# Patient Record
Sex: MALE | Race: WHITE | NOT HISPANIC OR LATINO | URBAN - METROPOLITAN AREA
[De-identification: names, ages, dates, MRNs, and addresses within clinical notes are randomized per-mention and may not be internally consistent; named-entity substitution may affect disease eponyms.]

---

## 2017-01-31 ENCOUNTER — OUTPATIENT (OUTPATIENT)
Dept: OUTPATIENT SERVICES | Facility: HOSPITAL | Age: 57
LOS: 1 days | End: 2017-01-31
Payer: COMMERCIAL

## 2017-01-31 LAB
BASOPHILS NFR BLD AUTO: 0.6 % — SIGNIFICANT CHANGE UP (ref 0–2)
EOSINOPHIL NFR BLD AUTO: 3.4 % — SIGNIFICANT CHANGE UP (ref 0–6)
ERYTHROCYTE [SEDIMENTATION RATE] IN BLOOD: 3 MM/HR — SIGNIFICANT CHANGE UP
HCT VFR BLD CALC: 44.6 % — SIGNIFICANT CHANGE UP (ref 39–50)
HGB BLD-MCNC: 15.5 G/DL — SIGNIFICANT CHANGE UP (ref 13–17)
LYMPHOCYTES # BLD AUTO: 21.8 % — SIGNIFICANT CHANGE UP (ref 13–44)
MCHC RBC-ENTMCNC: 30.3 PG — SIGNIFICANT CHANGE UP (ref 27–34)
MCHC RBC-ENTMCNC: 34.8 G/DL — SIGNIFICANT CHANGE UP (ref 32–36)
MCV RBC AUTO: 87.1 FL — SIGNIFICANT CHANGE UP (ref 80–100)
MONOCYTES NFR BLD AUTO: 8.7 % — SIGNIFICANT CHANGE UP (ref 2–14)
NEUTROPHILS NFR BLD AUTO: 65.5 % — SIGNIFICANT CHANGE UP (ref 43–77)
PLATELET # BLD AUTO: 322 K/UL — SIGNIFICANT CHANGE UP (ref 150–400)
RBC # BLD: 5.12 M/UL — SIGNIFICANT CHANGE UP (ref 4.2–5.8)
RBC # FLD: 12.6 % — SIGNIFICANT CHANGE UP (ref 10.3–16.9)
WBC # BLD: 6.2 K/UL — SIGNIFICANT CHANGE UP (ref 3.8–10.5)
WBC # FLD AUTO: 6.2 K/UL — SIGNIFICANT CHANGE UP (ref 3.8–10.5)

## 2017-01-31 PROCEDURE — 85652 RBC SED RATE AUTOMATED: CPT

## 2017-01-31 PROCEDURE — 85025 COMPLETE CBC W/AUTO DIFF WBC: CPT

## 2017-01-31 PROCEDURE — 36415 COLL VENOUS BLD VENIPUNCTURE: CPT

## 2017-02-01 DIAGNOSIS — R53.1 WEAKNESS: ICD-10-CM

## 2017-02-01 DIAGNOSIS — D47.2 MONOCLONAL GAMMOPATHY: ICD-10-CM

## 2017-02-01 DIAGNOSIS — K21.0 GASTRO-ESOPHAGEAL REFLUX DISEASE WITH ESOPHAGITIS: ICD-10-CM

## 2017-02-01 LAB
EOSINOPHIL NFR BLD AUTO: 6 % — SIGNIFICANT CHANGE UP (ref 0–6)
LYMPHOCYTES # BLD AUTO: 21 % — SIGNIFICANT CHANGE UP (ref 13–44)
MANUAL DIF COMMENT BLD-IMP: SIGNIFICANT CHANGE UP
MANUAL SMEAR VERIFICATION: YES — SIGNIFICANT CHANGE UP
MONOCYTES NFR BLD AUTO: 3 % — SIGNIFICANT CHANGE UP (ref 2–14)
NEUTROPHILS NFR BLD AUTO: 70 % — SIGNIFICANT CHANGE UP (ref 43–77)
PLAT MORPH BLD: NORMAL — SIGNIFICANT CHANGE UP
RBC BLD AUTO: NORMAL — SIGNIFICANT CHANGE UP

## 2017-09-11 ENCOUNTER — OUTPATIENT (OUTPATIENT)
Dept: OUTPATIENT SERVICES | Facility: HOSPITAL | Age: 57
LOS: 1 days | End: 2017-09-11
Payer: COMMERCIAL

## 2017-09-11 DIAGNOSIS — I10 ESSENTIAL (PRIMARY) HYPERTENSION: ICD-10-CM

## 2017-09-11 DIAGNOSIS — D47.2 MONOCLONAL GAMMOPATHY: ICD-10-CM

## 2017-09-11 DIAGNOSIS — R53.1 WEAKNESS: ICD-10-CM

## 2017-09-11 LAB
BASOPHILS NFR BLD AUTO: 0.5 % — SIGNIFICANT CHANGE UP (ref 0–2)
EOSINOPHIL NFR BLD AUTO: 3.6 % — SIGNIFICANT CHANGE UP (ref 0–6)
ERYTHROCYTE [SEDIMENTATION RATE] IN BLOOD: 2 MM/HR — SIGNIFICANT CHANGE UP
HCT VFR BLD CALC: 42.4 % — SIGNIFICANT CHANGE UP (ref 39–50)
HGB BLD-MCNC: 14.4 G/DL — SIGNIFICANT CHANGE UP (ref 13–17)
LYMPHOCYTES # BLD AUTO: 20.6 % — SIGNIFICANT CHANGE UP (ref 13–44)
MCHC RBC-ENTMCNC: 30.2 PG — SIGNIFICANT CHANGE UP (ref 27–34)
MCHC RBC-ENTMCNC: 34 G/DL — SIGNIFICANT CHANGE UP (ref 32–36)
MCV RBC AUTO: 88.9 FL — SIGNIFICANT CHANGE UP (ref 80–100)
MONOCYTES NFR BLD AUTO: 6.4 % — SIGNIFICANT CHANGE UP (ref 2–14)
NEUTROPHILS NFR BLD AUTO: 68.9 % — SIGNIFICANT CHANGE UP (ref 43–77)
PLATELET # BLD AUTO: 300 K/UL — SIGNIFICANT CHANGE UP (ref 150–400)
RBC # BLD: 4.77 M/UL — SIGNIFICANT CHANGE UP (ref 4.2–5.8)
RBC # FLD: 12.9 % — SIGNIFICANT CHANGE UP (ref 10.3–16.9)
WBC # BLD: 6.1 K/UL — SIGNIFICANT CHANGE UP (ref 3.8–10.5)
WBC # FLD AUTO: 6.1 K/UL — SIGNIFICANT CHANGE UP (ref 3.8–10.5)

## 2017-09-11 PROCEDURE — 85025 COMPLETE CBC W/AUTO DIFF WBC: CPT

## 2017-09-11 PROCEDURE — 36415 COLL VENOUS BLD VENIPUNCTURE: CPT

## 2017-09-11 PROCEDURE — 85652 RBC SED RATE AUTOMATED: CPT

## 2017-09-12 LAB
BASOPHILS NFR BLD AUTO: 1 % — SIGNIFICANT CHANGE UP (ref 0–2)
EOSINOPHIL NFR BLD AUTO: 3 % — SIGNIFICANT CHANGE UP (ref 0–6)
LYMPHOCYTES # BLD AUTO: 19 % — SIGNIFICANT CHANGE UP (ref 13–44)
MANUAL DIF COMMENT BLD-IMP: SIGNIFICANT CHANGE UP
MANUAL DIF COMMENT BLD-IMP: SIGNIFICANT CHANGE UP
MANUAL SMEAR VERIFICATION: YES — SIGNIFICANT CHANGE UP
MONOCYTES NFR BLD AUTO: 7 % — SIGNIFICANT CHANGE UP (ref 2–14)
MYELOCYTES NFR BLD: 1 % — HIGH
NEUTROPHILS NFR BLD AUTO: 69 % — SIGNIFICANT CHANGE UP (ref 43–77)
PLAT MORPH BLD: NORMAL — SIGNIFICANT CHANGE UP
RBC BLD AUTO: NORMAL — SIGNIFICANT CHANGE UP
SMUDGE CELLS # BLD: SIGNIFICANT CHANGE UP

## 2018-06-19 ENCOUNTER — OUTPATIENT (OUTPATIENT)
Dept: OUTPATIENT SERVICES | Facility: HOSPITAL | Age: 58
LOS: 1 days | End: 2018-06-19
Payer: COMMERCIAL

## 2018-06-19 LAB
BASOPHILS NFR BLD AUTO: 0.6 % — SIGNIFICANT CHANGE UP (ref 0–2)
EOSINOPHIL NFR BLD AUTO: 4.2 % — SIGNIFICANT CHANGE UP (ref 0–6)
ERYTHROCYTE [SEDIMENTATION RATE] IN BLOOD: 2 MM/HR — SIGNIFICANT CHANGE UP
HCT VFR BLD CALC: 40.9 % — SIGNIFICANT CHANGE UP (ref 39–50)
HGB BLD-MCNC: 13.8 G/DL — SIGNIFICANT CHANGE UP (ref 13–17)
LYMPHOCYTES # BLD AUTO: 22.5 % — SIGNIFICANT CHANGE UP (ref 13–44)
MCHC RBC-ENTMCNC: 29.9 PG — SIGNIFICANT CHANGE UP (ref 27–34)
MCHC RBC-ENTMCNC: 33.7 G/DL — SIGNIFICANT CHANGE UP (ref 32–36)
MCV RBC AUTO: 88.7 FL — SIGNIFICANT CHANGE UP (ref 80–100)
MONOCYTES NFR BLD AUTO: 6.1 % — SIGNIFICANT CHANGE UP (ref 2–14)
NEUTROPHILS NFR BLD AUTO: 66.6 % — SIGNIFICANT CHANGE UP (ref 43–77)
PLATELET # BLD AUTO: 280 K/UL — SIGNIFICANT CHANGE UP (ref 150–400)
RBC # BLD: 4.61 M/UL — SIGNIFICANT CHANGE UP (ref 4.2–5.8)
RBC # FLD: 12.9 % — SIGNIFICANT CHANGE UP (ref 10.3–16.9)
WBC # BLD: 5.2 K/UL — SIGNIFICANT CHANGE UP (ref 3.8–10.5)
WBC # FLD AUTO: 5.2 K/UL — SIGNIFICANT CHANGE UP (ref 3.8–10.5)

## 2018-06-19 PROCEDURE — 85652 RBC SED RATE AUTOMATED: CPT

## 2018-06-19 PROCEDURE — 85025 COMPLETE CBC W/AUTO DIFF WBC: CPT

## 2018-06-19 PROCEDURE — 99214 OFFICE O/P EST MOD 30 MIN: CPT

## 2018-06-19 PROCEDURE — 36415 COLL VENOUS BLD VENIPUNCTURE: CPT

## 2018-06-20 LAB
EOSINOPHIL NFR BLD AUTO: 4 % — SIGNIFICANT CHANGE UP (ref 0–6)
LYMPHOCYTES # BLD AUTO: 23 % — SIGNIFICANT CHANGE UP (ref 13–44)
MANUAL DIF COMMENT BLD-IMP: SIGNIFICANT CHANGE UP
MANUAL SMEAR VERIFICATION: YES — SIGNIFICANT CHANGE UP
MONOCYTES NFR BLD AUTO: 6 % — SIGNIFICANT CHANGE UP (ref 2–14)
NEUTROPHILS NFR BLD AUTO: 67 % — SIGNIFICANT CHANGE UP (ref 43–77)
PLAT MORPH BLD: NORMAL — SIGNIFICANT CHANGE UP
RBC BLD AUTO: NORMAL — SIGNIFICANT CHANGE UP

## 2018-06-21 DIAGNOSIS — D47.2 MONOCLONAL GAMMOPATHY: ICD-10-CM

## 2018-06-21 DIAGNOSIS — M79.1 MYALGIA: ICD-10-CM

## 2018-06-21 DIAGNOSIS — I10 ESSENTIAL (PRIMARY) HYPERTENSION: ICD-10-CM

## 2018-07-09 PROBLEM — Z00.00 ENCOUNTER FOR PREVENTIVE HEALTH EXAMINATION: Status: ACTIVE | Noted: 2018-07-09

## 2018-09-25 ENCOUNTER — APPOINTMENT (OUTPATIENT)
Dept: HEMATOLOGY ONCOLOGY | Facility: CLINIC | Age: 58
End: 2018-09-25
Payer: COMMERCIAL

## 2018-09-25 VITALS
TEMPERATURE: 98 F | HEART RATE: 71 BPM | HEIGHT: 72 IN | DIASTOLIC BLOOD PRESSURE: 80 MMHG | BODY MASS INDEX: 29.66 KG/M2 | WEIGHT: 219 LBS | OXYGEN SATURATION: 98 % | SYSTOLIC BLOOD PRESSURE: 116 MMHG

## 2018-09-25 DIAGNOSIS — Z82.0 FAMILY HISTORY OF EPILEPSY AND OTHER DISEASES OF THE NERVOUS SYSTEM: ICD-10-CM

## 2018-09-25 DIAGNOSIS — J45.909 UNSPECIFIED ASTHMA, UNCOMPLICATED: ICD-10-CM

## 2018-09-25 DIAGNOSIS — Z86.39 PERSONAL HISTORY OF OTHER ENDOCRINE, NUTRITIONAL AND METABOLIC DISEASE: ICD-10-CM

## 2018-09-25 DIAGNOSIS — S22.41XA MULTIPLE FRACTURES OF RIBS, RIGHT SIDE, INITIAL ENCOUNTER FOR CLOSED FRACTURE: ICD-10-CM

## 2018-09-25 DIAGNOSIS — Z82.5 FAMILY HISTORY OF ASTHMA AND OTHER CHRONIC LOWER RESPIRATORY DISEASES: ICD-10-CM

## 2018-09-25 DIAGNOSIS — K29.70 GASTRITIS, UNSPECIFIED, W/OUT BLEEDING: ICD-10-CM

## 2018-09-25 DIAGNOSIS — Z87.19 PERSONAL HISTORY OF OTHER DISEASES OF THE DIGESTIVE SYSTEM: ICD-10-CM

## 2018-09-25 DIAGNOSIS — Z78.9 OTHER SPECIFIED HEALTH STATUS: ICD-10-CM

## 2018-09-25 DIAGNOSIS — K21.9 GASTRO-ESOPHAGEAL REFLUX DISEASE W/OUT ESOPHAGITIS: ICD-10-CM

## 2018-09-25 DIAGNOSIS — Z80.1 FAMILY HISTORY OF MALIGNANT NEOPLASM OF TRACHEA, BRONCHUS AND LUNG: ICD-10-CM

## 2018-09-25 LAB
BASOPHILS NFR BLD AUTO: 0.5 %
EOSINOPHIL NFR BLD AUTO: 2.7 %
HCT VFR BLD CALC: 42.8 %
HGB BLD-MCNC: 14.5 G/DL
LYMPHOCYTES NFR BLD AUTO: 23.4 %
MAN DIFF?: NO
MCHC RBC-ENTMCNC: 30.8 PG
MCHC RBC-ENTMCNC: 33.9 G/DL
MCV RBC AUTO: 90.9 FL
MONOCYTES NFR BLD AUTO: 7.6 %
NEUTROPHILS NFR BLD AUTO: 65.8 %
PLATELET # BLD AUTO: 300 K/UL
RBC # BLD: 4.71 M/UL
RBC # FLD: 12.8 %
WBC # FLD AUTO: 5.9 K/UL

## 2018-09-25 PROCEDURE — 99214 OFFICE O/P EST MOD 30 MIN: CPT | Mod: 25

## 2018-09-25 PROCEDURE — 36415 COLL VENOUS BLD VENIPUNCTURE: CPT

## 2018-09-25 RX ORDER — CALCIUM CARBONATE 500 MG/1
TABLET, CHEWABLE ORAL
Refills: 0 | Status: ACTIVE | COMMUNITY

## 2018-09-25 RX ORDER — PROPRANOLOL HYDROCHLORIDE 10 MG/1
10 TABLET ORAL
Refills: 0 | Status: ACTIVE | COMMUNITY

## 2018-09-25 RX ORDER — ROSUVASTATIN CALCIUM 20 MG/1
20 TABLET, FILM COATED ORAL
Refills: 0 | Status: ACTIVE | COMMUNITY

## 2018-09-26 ENCOUNTER — LABORATORY RESULT (OUTPATIENT)
Age: 58
End: 2018-09-26

## 2018-09-26 LAB — ERYTHROCYTE [SEDIMENTATION RATE] IN BLOOD BY WESTERGREN METHOD: 2 MM/HR

## 2019-01-31 ENCOUNTER — APPOINTMENT (OUTPATIENT)
Dept: HEMATOLOGY ONCOLOGY | Facility: CLINIC | Age: 59
End: 2019-01-31
Payer: COMMERCIAL

## 2019-01-31 VITALS
WEIGHT: 222 LBS | BODY MASS INDEX: 30.07 KG/M2 | DIASTOLIC BLOOD PRESSURE: 82 MMHG | HEIGHT: 72 IN | TEMPERATURE: 97.6 F | HEART RATE: 76 BPM | SYSTOLIC BLOOD PRESSURE: 136 MMHG | OXYGEN SATURATION: 98 %

## 2019-01-31 DIAGNOSIS — R53.1 WEAKNESS: ICD-10-CM

## 2019-01-31 PROCEDURE — 99214 OFFICE O/P EST MOD 30 MIN: CPT | Mod: 25

## 2019-01-31 PROCEDURE — 36415 COLL VENOUS BLD VENIPUNCTURE: CPT

## 2019-01-31 NOTE — REASON FOR VISIT
[Monoclonal Gammopathy] : monoclonal gammopathy [Follow-Up Visit] : a follow-up visit for [FreeTextEntry2] : fatigue

## 2019-01-31 NOTE — HISTORY OF PRESENT ILLNESS
[de-identified] : Patient is a 59 year old male who is here for follow up for IgG kappa monoclonal gammopathy, which has been stable over several years, and fatigue. At last visit in September 2018, the IgG was 712, IgA 85, IgM 58..A faint IgG kappa band was again detected. All other parameters including CBC were completely normal. The patient feels well except for the usual fatigue which is likely work and stress related and occasional gastritis symptoms for which he takes TUMS.. The tightness in his chest which he attributes to anxiety has occurred less often. He has an upcoming annual exam with Dr. Zhang at which time he will request stress testing.

## 2019-02-01 LAB
BASOPHILS NFR BLD AUTO: 0.4 %
EOSINOPHIL NFR BLD AUTO: 2 %
ERYTHROCYTE [SEDIMENTATION RATE] IN BLOOD BY WESTERGREN METHOD: 1 MM/HR
HCT VFR BLD CALC: 45 %
HGB BLD-MCNC: 15.1 G/DL
LYMPHOCYTES NFR BLD AUTO: 20.9 %
MAN DIFF?: NO
MCHC RBC-ENTMCNC: 30.3 PG
MCHC RBC-ENTMCNC: 33.6 G/DL
MCV RBC AUTO: 90.2 FL
MONOCYTES NFR BLD AUTO: 8.3 %
NEUTROPHILS NFR BLD AUTO: 68.4 %
PLATELET # BLD AUTO: 307 K/UL
RBC # BLD: 4.99 M/UL
RBC # FLD: 12.8 %
WBC # FLD AUTO: 6.9 K/UL

## 2019-05-02 ENCOUNTER — APPOINTMENT (OUTPATIENT)
Dept: HEMATOLOGY ONCOLOGY | Facility: CLINIC | Age: 59
End: 2019-05-02
Payer: COMMERCIAL

## 2019-05-02 VITALS
HEIGHT: 72 IN | HEART RATE: 67 BPM | BODY MASS INDEX: 30.07 KG/M2 | OXYGEN SATURATION: 98 % | DIASTOLIC BLOOD PRESSURE: 84 MMHG | WEIGHT: 222 LBS | SYSTOLIC BLOOD PRESSURE: 130 MMHG | TEMPERATURE: 98.3 F

## 2019-05-02 LAB
BASOPHILS # BLD AUTO: 0.03 K/UL
BASOPHILS NFR BLD AUTO: 0.5 %
EOSINOPHIL # BLD AUTO: 0.18 K/UL
EOSINOPHIL NFR BLD AUTO: 3 %
HCT VFR BLD CALC: 44 %
HGB BLD-MCNC: 14.8 G/DL
IMM GRANULOCYTES NFR BLD AUTO: 0.5 %
LYMPHOCYTES # BLD AUTO: 1.48 K/UL
LYMPHOCYTES NFR BLD AUTO: 24.5 %
MAN DIFF?: NORMAL
MCHC RBC-ENTMCNC: 31.4 PG
MCHC RBC-ENTMCNC: 33.6 GM/DL
MCV RBC AUTO: 93.2 FL
MONOCYTES # BLD AUTO: 0.49 K/UL
MONOCYTES NFR BLD AUTO: 8.1 %
NEUTROPHILS # BLD AUTO: 3.83 K/UL
NEUTROPHILS NFR BLD AUTO: 63.4 %
PLATELET # BLD AUTO: 306 K/UL
RBC # BLD: 4.72 M/UL
RBC # FLD: 12.5 %
WBC # FLD AUTO: 6.04 K/UL

## 2019-05-02 PROCEDURE — 99213 OFFICE O/P EST LOW 20 MIN: CPT | Mod: 25

## 2019-05-02 PROCEDURE — 36415 COLL VENOUS BLD VENIPUNCTURE: CPT

## 2019-05-02 NOTE — HISTORY OF PRESENT ILLNESS
[de-identified] : Patient is a 58 year old male who is here for follow up for IgG kappa monoclonal gammopathy, which has been stable over several years, and fatigue. The patient feels fairly well. He takes TUMS for occasional gastritis which responds well. He was started on a new medication for hypercholesterolemia by his PCP, Dr. Zhang. Denies fever, chills, bone pain, sweats or infections.

## 2019-05-02 NOTE — END OF VISIT
[FreeTextEntry3] : All medical record entries made by the scribe were at my, Dr. Ridley direction and personally dictated by me on May 02, 2019 . I have reviewed the chart and agree that the record accurately reflects my personal performance of the history, physical exam, assessment and plan. I have also personally directed, reviewed, and agreed with the chart.\par

## 2019-05-02 NOTE — ASSESSMENT
[FreeTextEntry1] : Patient is a 58 year old male who is here for follow up for IgG kappa monoclonal gammopathy, which has been stable over several years, and fatigue. A future bone marrow biopsy was discussed with patient. Have ordered CBC, sedimentation rate, beta-2-microglobulin, uric acid, LDH, viscosity, CRP, Immunoelectrophoresis, Immunofixation, Immunoglobulin Free Light Chains, Immunoglobulins Panel, Protein Electrophoresis. Patient was advised to call office to discuss results and next appointment date.

## 2019-05-02 NOTE — ADDENDUM
[FreeTextEntry1] : I, Rochelle Austin, acted solely as a scribe for Dr. Ridley on May 02, 2019 .\par

## 2019-05-03 ENCOUNTER — LABORATORY RESULT (OUTPATIENT)
Age: 59
End: 2019-05-03

## 2019-05-03 LAB — ERYTHROCYTE [SEDIMENTATION RATE] IN BLOOD BY WESTERGREN METHOD: 2 MM/HR

## 2019-09-05 ENCOUNTER — APPOINTMENT (OUTPATIENT)
Dept: HEMATOLOGY ONCOLOGY | Facility: CLINIC | Age: 59
End: 2019-09-05
Payer: COMMERCIAL

## 2019-09-05 VITALS
BODY MASS INDEX: 30.2 KG/M2 | TEMPERATURE: 98.5 F | SYSTOLIC BLOOD PRESSURE: 124 MMHG | HEART RATE: 68 BPM | HEIGHT: 72 IN | OXYGEN SATURATION: 98 % | WEIGHT: 223 LBS | DIASTOLIC BLOOD PRESSURE: 80 MMHG

## 2019-09-05 LAB
BASOPHILS # BLD AUTO: 0.04 K/UL
BASOPHILS NFR BLD AUTO: 0.7 %
EOSINOPHIL # BLD AUTO: 0.29 K/UL
EOSINOPHIL NFR BLD AUTO: 5.3 %
ERYTHROCYTE [SEDIMENTATION RATE] IN BLOOD BY WESTERGREN METHOD: 1 MM/HR
HCT VFR BLD CALC: 44.2 %
HGB BLD-MCNC: 14.4 G/DL
IMM GRANULOCYTES NFR BLD AUTO: 0.2 %
LYMPHOCYTES # BLD AUTO: 1.35 K/UL
LYMPHOCYTES NFR BLD AUTO: 24.5 %
MAN DIFF?: NORMAL
MCHC RBC-ENTMCNC: 30.3 PG
MCHC RBC-ENTMCNC: 32.6 GM/DL
MCV RBC AUTO: 93.1 FL
MONOCYTES # BLD AUTO: 0.48 K/UL
MONOCYTES NFR BLD AUTO: 8.7 %
NEUTROPHILS # BLD AUTO: 3.33 K/UL
NEUTROPHILS NFR BLD AUTO: 60.6 %
PLATELET # BLD AUTO: 306 K/UL
RBC # BLD: 4.75 M/UL
RBC # FLD: 12.3 %
WBC # FLD AUTO: 5.5 K/UL

## 2019-09-05 PROCEDURE — 36415 COLL VENOUS BLD VENIPUNCTURE: CPT

## 2019-09-05 PROCEDURE — 99213 OFFICE O/P EST LOW 20 MIN: CPT | Mod: 25

## 2019-09-05 RX ORDER — EZETIMIBE 10 MG/1
10 TABLET ORAL
Refills: 0 | Status: ACTIVE | COMMUNITY

## 2019-09-05 NOTE — HISTORY OF PRESENT ILLNESS
[de-identified] : Patient is a 58 year old male who presents for follow up for IgG kappa monoclonal gammopathy, which has been stable over several years, and fatigue.  Patient complains of mild fatigue and right elbow soreness. Denies bone pain, unintentional weight loss, excessive bleeding, fever, chills, or sweats.Bone marrow studies were again discussed with patient and he is considering having this done with sedation in the outpatient setting at [facility]. He did not complete a 24 hour urine for immunoelectrophoresis when last requested but has agreed to do so presently.

## 2019-09-05 NOTE — ADDENDUM
[FreeTextEntry1] : I, Shakira Lopez, acted solely as a scribe for Dr. Christina Ridley on 09/05/2019.

## 2019-09-05 NOTE — END OF VISIT
[FreeTextEntry3] : All medical record entries made by the Scribe were at my, Dr. Christina Ridley, direction and personally dictated by me on 09/05/2019. I have reviewed the chart and agree that the record accurately reflects my personal performance of the history, physical exam, assessment and plan. I have also personally directed, reviewed, and agreed with the chart.

## 2019-09-05 NOTE — REASON FOR VISIT
[Follow-Up Visit] : a follow-up visit for [FreeTextEntry2] : IgG Kappa Monoclonal gammopathy, fatigue

## 2019-09-05 NOTE — ASSESSMENT
[FreeTextEntry1] : Patient is a 58 year old male who presents for follow up for IgG kappa monoclonal gammopathy, which has been stable over several years, and fatigue. Have again discussed the possibility of bone marrow studies. Patient will consider having the procedure in the near future. Have ordered CBC, beta-2-microglobulin, CMP, CRP, immunoelectrophoresis with immunofixation, immunoglobulin free light chains, immunoglobulins panel, protein electrophoresis, sedimentation rate, uric acid, and viscosity, 24 hour urine collection for immunoelectrophoresis.. Patient was advised to call office to discuss results and next appointment date. \par

## 2019-09-05 NOTE — REVIEW OF SYSTEMS
[Fatigue] : fatigue [Joint Pain] : joint pain [Joint Stiffness] : joint stiffness [Negative] : Allergic/Immunologic [FreeTextEntry9] : right elbow soreness

## 2020-02-24 ENCOUNTER — APPOINTMENT (OUTPATIENT)
Dept: HEMATOLOGY ONCOLOGY | Facility: CLINIC | Age: 60
End: 2020-02-24
Payer: COMMERCIAL

## 2020-02-24 VITALS
HEART RATE: 69 BPM | TEMPERATURE: 98 F | BODY MASS INDEX: 30.61 KG/M2 | WEIGHT: 226 LBS | DIASTOLIC BLOOD PRESSURE: 88 MMHG | HEIGHT: 72 IN | OXYGEN SATURATION: 98 % | SYSTOLIC BLOOD PRESSURE: 130 MMHG

## 2020-02-24 LAB
BASOPHILS # BLD AUTO: 0.05 K/UL
BASOPHILS NFR BLD AUTO: 0.9 %
EOSINOPHIL # BLD AUTO: 0.2 K/UL
EOSINOPHIL NFR BLD AUTO: 3.5 %
HCT VFR BLD CALC: 42 %
HGB BLD-MCNC: 14.3 G/DL
IMM GRANULOCYTES NFR BLD AUTO: 0.2 %
LYMPHOCYTES # BLD AUTO: 1.37 K/UL
LYMPHOCYTES NFR BLD AUTO: 24.2 %
MAN DIFF?: NORMAL
MCHC RBC-ENTMCNC: 31.2 PG
MCHC RBC-ENTMCNC: 34 GM/DL
MCV RBC AUTO: 91.5 FL
MONOCYTES # BLD AUTO: 0.57 K/UL
MONOCYTES NFR BLD AUTO: 10.1 %
NEUTROPHILS # BLD AUTO: 3.45 K/UL
NEUTROPHILS NFR BLD AUTO: 61.1 %
PLATELET # BLD AUTO: 261 K/UL
RBC # BLD: 4.59 M/UL
RBC # FLD: 12.9 %
WBC # FLD AUTO: 5.65 K/UL

## 2020-02-24 PROCEDURE — 99213 OFFICE O/P EST LOW 20 MIN: CPT | Mod: 25

## 2020-02-24 PROCEDURE — 36415 COLL VENOUS BLD VENIPUNCTURE: CPT

## 2020-02-24 NOTE — ASSESSMENT
[FreeTextEntry1] : Patient is a 59 year old male who presents for hematologic follow-up for IgG kappa monoclonal gammopathy, which has been stable over several years, and fatigue. Have again discussed the possibility of bone marrow studies, and patient will again consider having the procedure at some point. Have ordered CBC, beta-2-microglobulin, CMP, CRP, immunoelectrophoresis with immunofixation, immunoglobulin free light chains, immunoglobulins panel, protein electrophoresis, sedimentation rate, uric acid, and viscosity. Patient was advised to call office to discuss results and next appointment date.

## 2020-02-24 NOTE — END OF VISIT
[FreeTextEntry3] : All medical record entries made by the Scribe were at my, Dr. Christina Ridley, direction and personally dictated by me on 02/24/2020. I have reviewed the chart and agree that the record accurately reflects my personal performance of the history, physical exam, assessment and plan. I have also personally directed, reviewed, and agreed with the chart.

## 2020-02-24 NOTE — REVIEW OF SYSTEMS
[Negative] : Endocrine [FreeTextEntry4] : recently had tube insertion in both ears to relieve sinus congestion

## 2020-02-24 NOTE — REASON FOR VISIT
[Follow-Up Visit] : a follow-up visit for [FreeTextEntry2] : Monoclonal gammopathy of undetermined significance

## 2020-02-24 NOTE — ADDENDUM
[FreeTextEntry1] : I, Archie Dean, acted solely as a scribe for Dr. Christina Ridley on 02/24/2020.

## 2020-02-24 NOTE — HISTORY OF PRESENT ILLNESS
[de-identified] : Patient is a 59 year old male who presents for hematologic follow-up for IgG kappa monoclonal gammopathy, which has been stable over several years. Recent blood results revealed that there was little change in IgG kappa from previous determination and a faint IgG lambda was seen. CBC, CMP, Sed rate and all other parameters followed were normal.. Since the last visit, patient had tube insertion in both ears to relieve sinus congestion.  Bone marrow studies were again discussed with patient and he will consider having this done at some point. Patient is feeling generally well at this time, with no acute complaints. Denies fatigue, joint pain, fever, chills,  sweats bone pain or infections.

## 2020-02-25 ENCOUNTER — LABORATORY RESULT (OUTPATIENT)
Age: 60
End: 2020-02-25

## 2020-02-25 LAB — ERYTHROCYTE [SEDIMENTATION RATE] IN BLOOD BY WESTERGREN METHOD: 3 MM/HR

## 2020-07-23 ENCOUNTER — APPOINTMENT (OUTPATIENT)
Dept: HEMATOLOGY ONCOLOGY | Facility: CLINIC | Age: 60
End: 2020-07-23
Payer: COMMERCIAL

## 2020-07-23 VITALS
SYSTOLIC BLOOD PRESSURE: 138 MMHG | OXYGEN SATURATION: 99 % | BODY MASS INDEX: 30.2 KG/M2 | DIASTOLIC BLOOD PRESSURE: 90 MMHG | HEIGHT: 72 IN | HEART RATE: 60 BPM | TEMPERATURE: 97.3 F | WEIGHT: 223 LBS

## 2020-07-23 DIAGNOSIS — R20.0 ANESTHESIA OF SKIN: ICD-10-CM

## 2020-07-23 LAB
BASOPHILS # BLD AUTO: 0.06 K/UL
BASOPHILS NFR BLD AUTO: 1 %
EOSINOPHIL # BLD AUTO: 0.19 K/UL
EOSINOPHIL NFR BLD AUTO: 3.2 %
HCT VFR BLD CALC: 42.5 %
HGB BLD-MCNC: 14.5 G/DL
IMM GRANULOCYTES NFR BLD AUTO: 0.3 %
LYMPHOCYTES # BLD AUTO: 1.3 K/UL
LYMPHOCYTES NFR BLD AUTO: 21.8 %
MAN DIFF?: NORMAL
MCHC RBC-ENTMCNC: 30.9 PG
MCHC RBC-ENTMCNC: 34.1 GM/DL
MCV RBC AUTO: 90.6 FL
MONOCYTES # BLD AUTO: 0.5 K/UL
MONOCYTES NFR BLD AUTO: 8.4 %
NEUTROPHILS # BLD AUTO: 3.9 K/UL
NEUTROPHILS NFR BLD AUTO: 65.3 %
PLATELET # BLD AUTO: 288 K/UL
RBC # BLD: 4.69 M/UL
RBC # FLD: 12.8 %
WBC # FLD AUTO: 5.97 K/UL

## 2020-07-23 PROCEDURE — 36415 COLL VENOUS BLD VENIPUNCTURE: CPT

## 2020-07-23 PROCEDURE — 99213 OFFICE O/P EST LOW 20 MIN: CPT | Mod: 25

## 2020-07-23 NOTE — HISTORY OF PRESENT ILLNESS
[de-identified] : \par Patient is a 59 year old male who presents for hematologic follow-up for IgG kappa monoclonal gammopathy, which has been stable over several years. Blood results in September revealed that there was little change in IgG kappa from previous determination and a faint IgG lambda was seen. CBC, CMP, Sed rate and all other parameters followed were normal.. At his February 2020 visit, only a faint IgG kappa band was seen and the M-spike was undetectable,At last visit, patient had tube insertion in both ears to relieve sinus congestion which was successful. He now complains of slight numbness of both hands medially in ulnar nerve distribution. He has been lifting weights while at home during the pandemic.. He has occasional numbness of the bottom of the right great toe.Otherwise, Patient is feeling generally well at this time, with no acute complaints. Denies fatigue, joint pain, fever, chills, sweats, bone pain or infections. Of note, patient's cousin, who was a  during 9/11, was recently diagnosed with myeloma.\par \par

## 2020-07-23 NOTE — REVIEW OF SYSTEMS
[Negative] : Allergic/Immunologic [de-identified] : numbness in both hand medially and the right great toe

## 2020-07-23 NOTE — ASSESSMENT
[FreeTextEntry1] : \par Patient is a 59 year old male who presents for hematologic follow-up for IgG kappa monoclonal gammopathy, which has been stable over several years, and new complaint of numbness in the medial part of both hands.This may be related to stress on ulnar nerve from weight lifting. Have ordered CBC, beta-2-microglobulin, CMP, CRP, immunoelectrophoresis with immunofixation, immunoglobulin free light chains, immunoglobulins panel, protein electrophoresis, sedimentation rate, uric acid,  viscosity, B12 and folate.. Patient was advised to call office to discuss results and next appointment date. He will be seeing his PCP, Dr. Juan Miguel Zhang this afternoon.\par \par  \par

## 2020-07-24 LAB — ERYTHROCYTE [SEDIMENTATION RATE] IN BLOOD BY WESTERGREN METHOD: 0 MM/HR

## 2020-07-27 ENCOUNTER — LABORATORY RESULT (OUTPATIENT)
Age: 60
End: 2020-07-27

## 2020-10-26 ENCOUNTER — APPOINTMENT (OUTPATIENT)
Dept: HEMATOLOGY ONCOLOGY | Facility: CLINIC | Age: 60
End: 2020-10-26
Payer: COMMERCIAL

## 2020-10-26 VITALS
SYSTOLIC BLOOD PRESSURE: 130 MMHG | HEART RATE: 81 BPM | TEMPERATURE: 97.8 F | DIASTOLIC BLOOD PRESSURE: 78 MMHG | BODY MASS INDEX: 30.07 KG/M2 | WEIGHT: 222 LBS | OXYGEN SATURATION: 99 % | HEIGHT: 72 IN

## 2020-10-26 PROCEDURE — 99072 ADDL SUPL MATRL&STAF TM PHE: CPT

## 2020-10-26 PROCEDURE — 90471 IMMUNIZATION ADMIN: CPT

## 2020-10-26 PROCEDURE — 99213 OFFICE O/P EST LOW 20 MIN: CPT | Mod: 25

## 2020-10-26 PROCEDURE — 90686 IIV4 VACC NO PRSV 0.5 ML IM: CPT

## 2020-10-26 PROCEDURE — 36415 COLL VENOUS BLD VENIPUNCTURE: CPT

## 2020-10-26 RX ORDER — PREDNISONE 20 MG/1
20 TABLET ORAL
Qty: 24 | Refills: 0 | Status: DISCONTINUED | COMMUNITY
Start: 2020-05-09

## 2020-10-26 RX ORDER — TRIAMCINOLONE ACETONIDE 5 MG/G
0.5 CREAM TOPICAL
Qty: 30 | Refills: 0 | Status: DISCONTINUED | COMMUNITY
Start: 2020-05-09

## 2020-10-26 NOTE — HISTORY OF PRESENT ILLNESS
[de-identified] : Patient is a 60 year old male who presents for hematologic follow-up for IgG kappa monoclonal gammopathy, which has been stable over several years. Blood results in September 2020 revealed IgG kappa 768, essentially unchanged. M-spike was undetectable. The CBC, CMP and all other parameters followed were normal. Patient saw his PCP, Dr. Juan Miguel Zhang, earlier today. Patient is feeling generally well at this time, with no acute complaints. Denies fever, chills, sweats, or infections.

## 2020-10-26 NOTE — ASSESSMENT
[FreeTextEntry1] : Patient is a 60 year old male who presents for hematologic follow-up for IgG kappa monoclonal gammopathy, which has been stable over several years. Have ordered CBC, beta-2-microglobulin, CMP, CRP, immunoelectrophoresis with immunofixation, immunoglobulin free light chains, immunoglobulins panel, protein electrophoresis, sedimentation rate, uric acid, viscosity. Also ordered lipid panel (at patient's request). Have administered Fluzone quadrivalent vaccine 0.5mL in right deltoid without incident. Patient was advised to call office to discuss results and next appointment date.

## 2020-10-26 NOTE — END OF VISIT
[FreeTextEntry3] : All medical record entries made by the Scribe were at my, Dr. Christina Ridley, direction and personally dictated by me on 10/26/2020. I have reviewed the chart and agree that the record accurately reflects my personal performance of the history, physical exam, assessment and plan. I have also personally directed, reviewed, and agreed with the chart.

## 2020-10-26 NOTE — ADDENDUM
[FreeTextEntry1] : I, Archie Dean, acted solely as a scribe for Dr. Christina Ridley on 10/26/2020.

## 2020-10-27 LAB
BASOPHILS # BLD AUTO: 0.06 K/UL
BASOPHILS # BLD AUTO: 0.06 K/UL
BASOPHILS NFR BLD AUTO: 1 %
BASOPHILS NFR BLD AUTO: 1 %
EOSINOPHIL # BLD AUTO: 0 K/UL
EOSINOPHIL # BLD AUTO: 0 K/UL
EOSINOPHIL NFR BLD AUTO: 0 %
EOSINOPHIL NFR BLD AUTO: 0 %
ERYTHROCYTE [SEDIMENTATION RATE] IN BLOOD BY WESTERGREN METHOD: 3 MM/HR
HCT VFR BLD CALC: 42.4 %
HGB BLD-MCNC: 14.1 G/DL
LYMPHOCYTES # BLD AUTO: 1.18 K/UL
LYMPHOCYTES # BLD AUTO: 1.18 K/UL
LYMPHOCYTES NFR BLD AUTO: 21 %
LYMPHOCYTES NFR BLD AUTO: 21 %
MAN DIFF?: NORMAL
MCHC RBC-ENTMCNC: 30.5 PG
MCHC RBC-ENTMCNC: 33.3 GM/DL
MCV RBC AUTO: 91.8 FL
MONOCYTES # BLD AUTO: 0.28 K/UL
MONOCYTES # BLD AUTO: 0.28 K/UL
MONOCYTES NFR BLD AUTO: 5 %
MONOCYTES NFR BLD AUTO: 5 %
MSMEAR: NORMAL
NEUTROPHILS # BLD AUTO: 4.11 K/UL
NEUTROPHILS # BLD AUTO: 4.11 K/UL
NEUTROPHILS NFR BLD AUTO: 73 %
NEUTROPHILS NFR BLD AUTO: 73 %
NRBC # BLD MANUAL: 0 /100
PLAT MORPH BLD: NORMAL
PLATELET # BLD AUTO: 277 K/UL
RBC # BLD: 4.62 M/UL
RBC # FLD: 12.4 %
RBC BLD AUTO: NORMAL
WBC # FLD AUTO: 5.63 K/UL

## 2020-12-28 ENCOUNTER — NON-APPOINTMENT (OUTPATIENT)
Age: 60
End: 2020-12-28

## 2021-01-14 ENCOUNTER — APPOINTMENT (OUTPATIENT)
Dept: HEMATOLOGY ONCOLOGY | Facility: CLINIC | Age: 61
End: 2021-01-14
Payer: COMMERCIAL

## 2021-01-14 VITALS
BODY MASS INDEX: 30.48 KG/M2 | HEIGHT: 72 IN | HEART RATE: 60 BPM | DIASTOLIC BLOOD PRESSURE: 84 MMHG | TEMPERATURE: 97.3 F | WEIGHT: 225 LBS | SYSTOLIC BLOOD PRESSURE: 136 MMHG | OXYGEN SATURATION: 99 %

## 2021-01-14 PROCEDURE — 99072 ADDL SUPL MATRL&STAF TM PHE: CPT

## 2021-01-14 PROCEDURE — 36415 COLL VENOUS BLD VENIPUNCTURE: CPT

## 2021-01-14 PROCEDURE — 99213 OFFICE O/P EST LOW 20 MIN: CPT | Mod: 25

## 2021-01-14 NOTE — PHYSICAL EXAM
[Normal] : affect appropriate [de-identified] : 1.5-2 cm. scalp nodule at crown of head, nontender, immobile

## 2021-01-14 NOTE — ASSESSMENT
[FreeTextEntry1] : Patient is a 60 year old male who presents for hematologic follow-up for IgG kappa monoclonal gammopathy, which has been stable over several years.  Have ordered CBC, beta-2-microglobulin, CMP, CRP, immunoelectrophoresis with immunofixation, immunoglobulin free light chains, immunoglobulins panel, protein electrophoresis, sedimentation rate, uric acid, viscosity.He will be seeing a dermatologist regarding scalp nodule. Patient was advised to call office to discuss results and next appointment date.

## 2021-01-14 NOTE — END OF VISIT
[FreeTextEntry3] : All medical record entries made by the Scribe were at my, Dr. Christina Ridley, direction and personally dictated by me on 01/14/2021. I have reviewed the chart and agree that the record accurately reflects my personal performance of the history, physical exam, assessment and plan. I have also personally directed, reviewed, and agreed with the chart.

## 2021-01-14 NOTE — HISTORY OF PRESENT ILLNESS
[de-identified] : Patient is a 60 year old male who presents for hematologic follow-up for IgG kappa monoclonal gammopathy, which has been stable over several years. Blood results in October 2020 revealed IgG kappa 797, essentially unchanged.  IgM slightly elevated and IgA slightly decreased. M-spike was undetectable. The CBC, CMP and all other parameters followed were normal. Patient is feeling generally well at this time, with no acute complaints. Denies fever, chills, sweats, or infections.

## 2021-01-14 NOTE — ADDENDUM
[FreeTextEntry1] : I, Archie Dean, acted solely as a scribe for Dr. Christina Ridley on 01/14/2021.

## 2021-01-15 LAB
BASOPHILS # BLD AUTO: 0.06 K/UL
BASOPHILS # BLD AUTO: 0.06 K/UL
BASOPHILS NFR BLD AUTO: 0.9 %
BASOPHILS NFR BLD AUTO: 0.9 %
EOSINOPHIL # BLD AUTO: 0.23 K/UL
EOSINOPHIL # BLD AUTO: 0.23 K/UL
EOSINOPHIL NFR BLD AUTO: 3.5 %
EOSINOPHIL NFR BLD AUTO: 3.5 %
ERYTHROCYTE [SEDIMENTATION RATE] IN BLOOD BY WESTERGREN METHOD: 2 MM/HR
GIANT PLATELETS BLD QL SMEAR: PRESENT
HCT VFR BLD CALC: 45.8 %
HGB BLD-MCNC: 14.8 G/DL
LYMPHOCYTES # BLD AUTO: 1.12 K/UL
LYMPHOCYTES # BLD AUTO: 1.12 K/UL
LYMPHOCYTES NFR BLD AUTO: 16.7 %
LYMPHOCYTES NFR BLD AUTO: 16.7 %
MAN DIFF?: NORMAL
MCHC RBC-ENTMCNC: 30.2 PG
MCHC RBC-ENTMCNC: 32.3 GM/DL
MCV RBC AUTO: 93.5 FL
MONOCYTES # BLD AUTO: 0.41 K/UL
MONOCYTES # BLD AUTO: 0.41 K/UL
MONOCYTES NFR BLD AUTO: 6.1 %
MONOCYTES NFR BLD AUTO: 6.1 %
MSMEAR: NORMAL
NEUTROPHILS # BLD AUTO: 4.52 K/UL
NEUTROPHILS # BLD AUTO: 4.52 K/UL
NEUTROPHILS NFR BLD AUTO: 67.5 %
NEUTROPHILS NFR BLD AUTO: 67.5 %
PLAT MORPH BLD: NORMAL
PLATELET # BLD AUTO: 320 K/UL
RBC # BLD: 4.9 M/UL
RBC # FLD: 12.5 %
RBC BLD AUTO: NORMAL
VARIANT LYMPHS # BLD MANUAL: 5.3 %
WBC # FLD AUTO: 6.7 K/UL

## 2021-02-26 ENCOUNTER — NON-APPOINTMENT (OUTPATIENT)
Age: 61
End: 2021-02-26

## 2021-04-15 ENCOUNTER — APPOINTMENT (OUTPATIENT)
Dept: HEMATOLOGY ONCOLOGY | Facility: CLINIC | Age: 61
End: 2021-04-15
Payer: COMMERCIAL

## 2021-04-15 VITALS
BODY MASS INDEX: 29.93 KG/M2 | HEART RATE: 72 BPM | SYSTOLIC BLOOD PRESSURE: 140 MMHG | DIASTOLIC BLOOD PRESSURE: 92 MMHG | TEMPERATURE: 97.5 F | HEIGHT: 72 IN | WEIGHT: 221 LBS | OXYGEN SATURATION: 98 %

## 2021-04-15 LAB
BASOPHILS # BLD AUTO: 0.04 K/UL
BASOPHILS NFR BLD AUTO: 0.6 %
EOSINOPHIL # BLD AUTO: 0.17 K/UL
EOSINOPHIL NFR BLD AUTO: 2.5 %
HCT VFR BLD CALC: 44.8 %
HGB BLD-MCNC: 15.2 G/DL
IMM GRANULOCYTES NFR BLD AUTO: 0.3 %
LYMPHOCYTES # BLD AUTO: 1.39 K/UL
LYMPHOCYTES NFR BLD AUTO: 20.1 %
MAN DIFF?: NORMAL
MCHC RBC-ENTMCNC: 30.4 PG
MCHC RBC-ENTMCNC: 33.9 GM/DL
MCV RBC AUTO: 89.6 FL
MONOCYTES # BLD AUTO: 0.52 K/UL
MONOCYTES NFR BLD AUTO: 7.5 %
NEUTROPHILS # BLD AUTO: 4.79 K/UL
NEUTROPHILS NFR BLD AUTO: 69 %
PLATELET # BLD AUTO: 294 K/UL
RBC # BLD: 5 M/UL
RBC # FLD: 12.2 %
WBC # FLD AUTO: 6.93 K/UL

## 2021-04-15 PROCEDURE — 99213 OFFICE O/P EST LOW 20 MIN: CPT | Mod: 25

## 2021-04-15 PROCEDURE — 99072 ADDL SUPL MATRL&STAF TM PHE: CPT

## 2021-04-15 PROCEDURE — 36415 COLL VENOUS BLD VENIPUNCTURE: CPT

## 2021-04-15 NOTE — ADDENDUM
[FreeTextEntry1] : I, Archie Dean, acted solely as a scribe for Dr. Christina Ridley on 04/15/2021.

## 2021-04-15 NOTE — END OF VISIT
[FreeTextEntry3] : All medical record entries made by the Scribe were at my, Dr. Christina Ridley, direction and personally dictated by me on 04/15/2021. I have reviewed the chart and agree that the record accurately reflects my personal performance of the history, physical exam, assessment and plan. I have also personally directed, reviewed, and agreed with the chart.

## 2021-04-15 NOTE — PHYSICAL EXAM
[Normal] : affect appropriate [de-identified] : 1.5-2 cm. scalp nodule at crown of head, nontender, immobile

## 2021-04-15 NOTE — ASSESSMENT
[FreeTextEntry1] : Patient is a 60 year old male who presents for hematologic follow-up for IgG kappa monoclonal gammopathy, which has been stable over several years. Have ordered CBC, beta-2-microglobulin, CMP, CRP, immunoelectrophoresis with immunofixation, immunoglobulin free light chains, immunoglobulins panel, LDH, protein electrophoresis, sedimentation rate, uric acid, viscosity. He will be seeing a dermatologist regarding scalp nodule. Patient was advised to call office to discuss results and next appointment date.A copy of blood results will be given to his PCP, Dr. Zhang.

## 2021-04-15 NOTE — HISTORY OF PRESENT ILLNESS
[de-identified] : Patient is a 60 year old male who presents for hematologic follow-up for IgG kappa monoclonal gammopathy, which has been stable over several years. Blood results in January 2021 revealed IgG kappa 809 (essentially unchanged), and IgA slightly decreased. M-spike was undetectable. The CBC, CMP and all other parameters followed were normal. Patient is feeling generally well at this time, with no acute complaints. He has not yet seen a dermatologist regarding scalp nodule. Denies fever, chills, sweats, or infections. Of note, patient recently received both Pfizer Covid-19 vaccines.

## 2021-04-16 LAB — ERYTHROCYTE [SEDIMENTATION RATE] IN BLOOD BY WESTERGREN METHOD: 2 MM/HR

## 2021-05-03 ENCOUNTER — NON-APPOINTMENT (OUTPATIENT)
Age: 61
End: 2021-05-03

## 2021-07-15 ENCOUNTER — APPOINTMENT (OUTPATIENT)
Dept: HEMATOLOGY ONCOLOGY | Facility: CLINIC | Age: 61
End: 2021-07-15
Payer: COMMERCIAL

## 2021-07-15 VITALS
WEIGHT: 222 LBS | HEART RATE: 74 BPM | OXYGEN SATURATION: 97 % | SYSTOLIC BLOOD PRESSURE: 140 MMHG | HEIGHT: 72 IN | DIASTOLIC BLOOD PRESSURE: 92 MMHG | TEMPERATURE: 96.9 F | BODY MASS INDEX: 30.07 KG/M2

## 2021-07-15 LAB — ERYTHROCYTE [SEDIMENTATION RATE] IN BLOOD BY WESTERGREN METHOD: 2 MM/HR

## 2021-07-15 PROCEDURE — 99213 OFFICE O/P EST LOW 20 MIN: CPT | Mod: 25

## 2021-07-15 PROCEDURE — 36415 COLL VENOUS BLD VENIPUNCTURE: CPT

## 2021-07-15 PROCEDURE — 99072 ADDL SUPL MATRL&STAF TM PHE: CPT

## 2021-07-15 NOTE — HISTORY OF PRESENT ILLNESS
[de-identified] : Patient is a 60 year old male who presents for hematologic follow-up for IgG kappa monoclonal gammopathy, which has been stable over several years. Blood results from April 2021 revealed an unchanged IgG kappa at 804 and a very faint band. M-spike was low and  could not be quantitated, IgA was slightly diminished as before. CBC, creatinine, sed rate and other parameters followed were normal or stable. Patient complains of left ear discomfort and drainage due to his ear tubes, for which he is seeing his ENT physician today. Otherwise he is feeling generally well at this time. Denies fatigue, fever, chills, sweats, or infections.

## 2021-07-15 NOTE — PHYSICAL EXAM
[Normal] : affect appropriate [de-identified] : 1.5-2 cm. scalp nodule at crown of head, nontender, immobile

## 2021-07-15 NOTE — END OF VISIT
[FreeTextEntry3] : All medical record entries made by the Scribe were at my, Dr. Christina Ridley, direction and personally dictated by me on 07/15/2021. I have reviewed the chart and agree that the record accurately reflects my personal performance of the history, physical exam, assessment and plan. I have also personally directed, reviewed, and agreed with the chart.

## 2021-07-15 NOTE — REVIEW OF SYSTEMS
[Negative] : Allergic/Immunologic [Loss of Hearing] : no loss of hearing [Skin Rash] : no skin rash [FreeTextEntry4] : Left ear discomfort with congestion and drainage [de-identified] : saw dermatologist for scalp cyst

## 2021-07-15 NOTE — ASSESSMENT
[FreeTextEntry1] : Patient is a 60 year old male who presents for hematologic follow-up for IgG kappa monoclonal gammopathy of undetermined significance, which has been stable over several years. Have ordered B2MG, CBC, CMP, CRP, IPEP, SPEP, immunofixation, immunoglobulins, LDH, sed rate, uric acid, and viscosity. Patient was advised to call office to discuss results and next appointment date. A copy of blood results will be given to his PCP, Dr. Zhang.

## 2021-07-16 LAB
ANISOCYTOSIS BLD QL: SLIGHT
BASOPHILS # BLD AUTO: 0.07 K/UL
BASOPHILS # BLD AUTO: 0.07 K/UL
BASOPHILS NFR BLD AUTO: 0.9 %
BASOPHILS NFR BLD AUTO: 0.9 %
BURR CELLS BLD QL SMEAR: PRESENT
EOSINOPHIL # BLD AUTO: 0.39 K/UL
EOSINOPHIL # BLD AUTO: 0.39 K/UL
EOSINOPHIL NFR BLD AUTO: 5.2 %
EOSINOPHIL NFR BLD AUTO: 5.2 %
GIANT PLATELETS BLD QL SMEAR: PRESENT
HCT VFR BLD CALC: 44 %
HGB BLD-MCNC: 14.9 G/DL
LYMPHOCYTES # BLD AUTO: 1.42 K/UL
LYMPHOCYTES # BLD AUTO: 1.42 K/UL
LYMPHOCYTES NFR BLD AUTO: 19.1 %
LYMPHOCYTES NFR BLD AUTO: 19.1 %
MAN DIFF?: NORMAL
MCHC RBC-ENTMCNC: 30.5 PG
MCHC RBC-ENTMCNC: 33.9 GM/DL
MCV RBC AUTO: 90 FL
MONOCYTES # BLD AUTO: 0.52 K/UL
MONOCYTES # BLD AUTO: 0.52 K/UL
MONOCYTES NFR BLD AUTO: 7 %
MONOCYTES NFR BLD AUTO: 7 %
MSMEAR: NORMAL
NEUTROPHILS # BLD AUTO: 5.05 K/UL
NEUTROPHILS # BLD AUTO: 5.05 K/UL
NEUTROPHILS NFR BLD AUTO: 67.8 %
NEUTROPHILS NFR BLD AUTO: 67.8 %
OVALOCYTES BLD QL SMEAR: SLIGHT
PLAT MORPH BLD: ABNORMAL
PLATELET # BLD AUTO: 312 K/UL
POIKILOCYTOSIS BLD QL SMEAR: SLIGHT
POLYCHROMASIA BLD QL SMEAR: SLIGHT
RBC # BLD: 4.89 M/UL
RBC # FLD: 12.5 %
RBC BLD AUTO: ABNORMAL
WBC # FLD AUTO: 7.45 K/UL

## 2021-08-03 ENCOUNTER — NON-APPOINTMENT (OUTPATIENT)
Age: 61
End: 2021-08-03

## 2021-08-28 NOTE — REVIEW OF SYSTEMS
[Fatigue] : fatigue [Negative] : Allergic/Immunologic [FreeTextEntry7] : gastritis symptoms after meals Showering allowed/Walking - Indoors allowed

## 2021-09-27 NOTE — ADDENDUM
[FreeTextEntry1] : I, Lizbeth Mercado, acted solely as a scribe for Dr. Christina Ridley on 07/15/2021. 
yes

## 2021-10-14 ENCOUNTER — APPOINTMENT (OUTPATIENT)
Dept: HEMATOLOGY ONCOLOGY | Facility: CLINIC | Age: 61
End: 2021-10-14
Payer: COMMERCIAL

## 2021-10-14 VITALS
WEIGHT: 220 LBS | OXYGEN SATURATION: 97 % | BODY MASS INDEX: 29.8 KG/M2 | HEIGHT: 72 IN | DIASTOLIC BLOOD PRESSURE: 80 MMHG | SYSTOLIC BLOOD PRESSURE: 138 MMHG | HEART RATE: 78 BPM | TEMPERATURE: 96.7 F

## 2021-10-14 PROCEDURE — 36415 COLL VENOUS BLD VENIPUNCTURE: CPT

## 2021-10-14 PROCEDURE — 99213 OFFICE O/P EST LOW 20 MIN: CPT | Mod: 25

## 2021-10-14 NOTE — REASON FOR VISIT
[Follow-Up Visit] : a follow-up visit for [FreeTextEntry2] : Ig G kappa Monoclonal gammopathy of undetermined significance

## 2021-10-14 NOTE — ASSESSMENT
[FreeTextEntry1] : Patient is a 61 year old male who presents for hematologic follow-up for IgG kappa monoclonal gammopathy of undetermined significance, which has been stable over several years (since 2015). A CBC and CMP were drawn by patient's PCP earlier today, so have only ordered B2MG, CRP, IPEP, immunofixation, immunoglobulin FLC, immunoglobulins panel, LDH, SPEP, uric acid, and viscosity. Patient was advised to call office to discuss results and next appointment date. A copy of blood results will be given to his PCP, Dr. Zhang.

## 2021-10-14 NOTE — HISTORY OF PRESENT ILLNESS
[de-identified] : Patient is a 61 year old male who presents for hematologic follow-up for IgG kappa monoclonal gammopathy, which has been stable over several years (since first noted in 2015). Blood results from July 2021 revealed the CBC was normal white a white blood count of 7.45, hemoglobin 14.9, and platelet count 312,000. Comprehensive metabolic panel was completely normal. The sedimentation rate was 2. Protein electrophoresis again revealed a faint band in the gamma region of the M spike, suspicious for monoclonal immunoglobulin. Immunofixation revealed an IgG monoclonal protein with kappa light chain specificity. The amounts of IgG, IgA, and IgM were unchanged. The kappa/lambda ratio was normal at 1.61. Beta-2 microglobulin, C-reactive protein, LDH, and uric acid were normal. He is feeling generally well at this time, with no acute complaints. Denies fatigue, bone pain, fever, chills, sweats, or infections. Of note, patient saw his PCP Dr. Zhang earlier today for a routine visit, at which time he received a flu vaccine.

## 2021-10-14 NOTE — PHYSICAL EXAM
[Normal] : affect appropriate [de-identified] : 1.5-2 cm. scalp nodule at crown of head, nontender, immobile

## 2021-10-14 NOTE — ADDENDUM
[FreeTextEntry1] : I, Lizbeth Mercado, acted solely as a scribe for Dr. Christina Ridley on 10/14/2021

## 2021-10-14 NOTE — END OF VISIT
[FreeTextEntry3] : All medical record entries made by the Scribe were at my, Dr. Christina Ridley, direction and personally dictated by me on 10/14/2021. I have reviewed the chart and agree that the record accurately reflects my personal performance of the history, physical exam, assessment and plan. I have also personally directed, reviewed, and agreed with the chart.

## 2021-10-14 NOTE — REVIEW OF SYSTEMS
[Negative] : Allergic/Immunologic [Fever] : no fever [Chills] : no chills [Night Sweats] : no night sweats [Fatigue] : no fatigue [Recent Change In Weight] : ~T no recent weight change [Joint Pain] : no joint pain [Joint Stiffness] : no joint stiffness

## 2021-12-02 ENCOUNTER — NON-APPOINTMENT (OUTPATIENT)
Age: 61
End: 2021-12-02

## 2022-01-20 ENCOUNTER — APPOINTMENT (OUTPATIENT)
Dept: HEMATOLOGY ONCOLOGY | Facility: CLINIC | Age: 62
End: 2022-01-20

## 2022-04-04 ENCOUNTER — APPOINTMENT (OUTPATIENT)
Dept: HEMATOLOGY ONCOLOGY | Facility: CLINIC | Age: 62
End: 2022-04-04
Payer: COMMERCIAL

## 2022-04-04 VITALS
TEMPERATURE: 96.2 F | WEIGHT: 225 LBS | SYSTOLIC BLOOD PRESSURE: 144 MMHG | HEIGHT: 72 IN | HEART RATE: 85 BPM | DIASTOLIC BLOOD PRESSURE: 80 MMHG | BODY MASS INDEX: 30.48 KG/M2 | OXYGEN SATURATION: 96 %

## 2022-04-04 LAB — ERYTHROCYTE [SEDIMENTATION RATE] IN BLOOD BY WESTERGREN METHOD: 1 MM/HR

## 2022-04-04 PROCEDURE — 36415 COLL VENOUS BLD VENIPUNCTURE: CPT

## 2022-04-04 PROCEDURE — 99214 OFFICE O/P EST MOD 30 MIN: CPT | Mod: 25

## 2022-04-04 NOTE — REVIEW OF SYSTEMS
[Recent Change In Weight] : ~T recent weight change [Chest Pain] : chest pain [Shortness Of Breath] : shortness of breath [SOB on Exertion] : shortness of breath during exertion [Negative] : Allergic/Immunologic [Fever] : no fever [Chills] : no chills [Night Sweats] : no night sweats [Fatigue] : no fatigue [Joint Pain] : no joint pain [Skin Rash] : no skin rash [Easy Bleeding] : no tendency for easy bleeding [Easy Bruising] : no tendency for easy bruising [FreeTextEntry2] : Gained 5 pounds [FreeTextEntry3] : eyelid cyst [FreeTextEntry4] : sinus congestion [FreeTextEntry6] : sporadically [FreeTextEntry7] : Gastric reflux

## 2022-04-04 NOTE — PHYSICAL EXAM
[Normal] : affect appropriate [de-identified] : 1.5-2 cm. scalp nodule at crown of head, nontender, immobile, unchanged

## 2022-04-04 NOTE — HISTORY OF PRESENT ILLNESS
[de-identified] : \par Patient is a 61 year old male with  IgG kappa monoclonal gammopathy, which has been stable over several years (since first noted in 2015) who returns for follow up. At last visit, results revealed a faint monoclonal IgG kappa band as before. The total IgG was normal at 941. K/L ratio slightly elevated at 1.98. All other parameters, including the CBC and CMP were again normal.  Since last visit the patient developed positivity for COVID-19 in December 2021 with mild symptoms for 2 days.  He presently complains of sinus congestion, sporadic shortness of breath, chronic gastric reflux and occasional left sided chest discomfort.  He denies fever chills, unusual bone pain, weight loss, or recent infection.

## 2022-04-04 NOTE — REASON FOR VISIT
[Follow-Up Visit] : a follow-up visit for [FreeTextEntry2] : IgG Monoclonal Gammopathy of undetermined significance

## 2022-04-04 NOTE — ASSESSMENT
[FreeTextEntry1] : \par Patient is a 61 year old male who presents for hematologic follow-up for IgG kappa monoclonal gammopathy of undetermined significance, which has been stable over several years (since 2015) without elevation in the total IgG, and without any effect on blood counts, renal function, or other parameters.  He has managed to avoid having bone marrow aspirate and biopsy due to the fact that there has been  no significant change over these years.  Have ordered CBC, sedimentation rate, manual differential, comprehensive metabolic panel, serum protein electrophoresis, immunoelectrophoresis, immunofixation, kappa to lambda ratio, serum free light chains, LDH, C-reactive protein, beta-2 microglobulin.  Venipuncture was performed in the office today.  Regarding his new complaints of shortness of breath and left-sided chest pain, advised that he follow-up with his cardiologist who he has not seen in quite a while, Dr. Webber.  A copy of today's results will be given to his PCP, Dr. Juan Miguel Zhang..

## 2022-04-05 LAB
ALBUMIN SERPL ELPH-MCNC: 4.5 G/DL
ALP BLD-CCNC: 60 U/L
ALT SERPL-CCNC: 29 U/L
ANION GAP SERPL CALC-SCNC: 13 MMOL/L
AST SERPL-CCNC: 28 U/L
B2 MICROGLOB SERPL-MCNC: 1.8 MG/L
BILIRUB SERPL-MCNC: 0.4 MG/DL
BLD SMEAR INTERP: NORMAL
BUN SERPL-MCNC: 11 MG/DL
CALCIUM SERPL-MCNC: 9.4 MG/DL
CHLORIDE SERPL-SCNC: 103 MMOL/L
CO2 SERPL-SCNC: 24 MMOL/L
CREAT SERPL-MCNC: 0.94 MG/DL
CRP SERPL-MCNC: 4 MG/L
EGFR: 92 ML/MIN/1.73M2
EOSINOPHIL NFR BLD AUTO: 2 %
GLUCOSE SERPL-MCNC: 109 MG/DL
HCT VFR BLD CALC: 44 %
HGB BLD-MCNC: 15.1 G/DL
LDH SERPL-CCNC: 191 U/L
LYMPHOCYTES NFR BLD AUTO: 22 %
MAN DIFF?: NORMAL
MCHC RBC-ENTMCNC: 30.9 PG
MCHC RBC-ENTMCNC: 34.3 GM/DL
MCV RBC AUTO: 90 FL
MONOCYTES NFR BLD AUTO: 4 %
MSMEAR: YES
NEUTROPHILS NFR BLD AUTO: 72 %
NEUTROPHILS NFR BLD AUTO: 72 %
PLAT MORPH BLD: NORMAL
PLATELET # BLD AUTO: 320 K/UL
POTASSIUM SERPL-SCNC: 4.2 MMOL/L
PROT SERPL-MCNC: 6.8 G/DL
RBC # BLD: 4.89 M/UL
RBC # FLD: 12.7 %
RBC BLD AUTO: NORMAL
SODIUM SERPL-SCNC: 141 MMOL/L
URATE SERPL-MCNC: 6.9 MG/DL
WBC # FLD AUTO: 6.2 K/UL

## 2022-04-06 LAB
DEPRECATED KAPPA LC FREE/LAMBDA SER: 1.47 RATIO
KAPPA LC CSF-MCNC: 1 MG/DL
KAPPA LC SERPL-MCNC: 1.47 MG/DL

## 2022-04-08 LAB
ALBUMIN MFR SERPL ELPH: 65.9 %
ALBUMIN SERPL-MCNC: 4.5 G/DL
ALBUMIN/GLOB SERPL: 2 RATIO
ALPHA1 GLOB MFR SERPL ELPH: 3.9 %
ALPHA1 GLOB SERPL ELPH-MCNC: 0.3 G/DL
ALPHA2 GLOB MFR SERPL ELPH: 8.8 %
ALPHA2 GLOB SERPL ELPH-MCNC: 0.6 G/DL
B-GLOBULIN MFR SERPL ELPH: 9.6 %
B-GLOBULIN SERPL ELPH-MCNC: 0.7 G/DL
DEPRECATED KAPPA LC FREE/LAMBDA SER: 1.47 RATIO
GAMMA GLOB FLD ELPH-MCNC: 0.8 G/DL
GAMMA GLOB MFR SERPL ELPH: 11.8 %
IGA SER QL IEP: 77 MG/DL
IGG SER QL IEP: 805 MG/DL
IGM SER QL IEP: 85 MG/DL
INTERPRETATION SERPL IEP-IMP: NORMAL
KAPPA LC CSF-MCNC: 1 MG/DL
KAPPA LC SERPL-MCNC: 1.47 MG/DL
M PROTEIN MFR SERPL ELPH: NORMAL
M PROTEIN SPEC IFE-MCNC: NORMAL
MONOCLON BAND OBS SERPL: NORMAL
PROT SERPL-MCNC: 6.8 G/DL
PROT SERPL-MCNC: 6.8 G/DL

## 2022-04-09 LAB — VISCOSITY, SERUM: 1.6

## 2022-04-26 ENCOUNTER — NON-APPOINTMENT (OUTPATIENT)
Age: 62
End: 2022-04-26

## 2022-07-05 ENCOUNTER — APPOINTMENT (OUTPATIENT)
Dept: HEMATOLOGY ONCOLOGY | Facility: CLINIC | Age: 62
End: 2022-07-05

## 2022-07-05 ENCOUNTER — LABORATORY RESULT (OUTPATIENT)
Age: 62
End: 2022-07-05

## 2022-07-05 VITALS
HEART RATE: 67 BPM | SYSTOLIC BLOOD PRESSURE: 132 MMHG | WEIGHT: 223 LBS | OXYGEN SATURATION: 97 % | TEMPERATURE: 96.3 F | DIASTOLIC BLOOD PRESSURE: 80 MMHG | BODY MASS INDEX: 30.2 KG/M2 | HEIGHT: 72 IN

## 2022-07-05 DIAGNOSIS — R25.2 CRAMP AND SPASM: ICD-10-CM

## 2022-07-05 PROCEDURE — 99214 OFFICE O/P EST MOD 30 MIN: CPT | Mod: 25

## 2022-07-05 PROCEDURE — 36415 COLL VENOUS BLD VENIPUNCTURE: CPT

## 2022-07-05 NOTE — PHYSICAL EXAM
[Normal] : affect appropriate [de-identified] : 1.5-2 cm. scalp nodule at crown of head, nontender, immobile, unchanged

## 2022-07-05 NOTE — HISTORY OF PRESENT ILLNESS
[de-identified] :  Patient is a 61 year old male with IgG kappa monoclonal gammopathy, which has been stable over several years (since first noted in 2015) who returns for follow up.  Patient developed positivity for COVID-19 in December 2021 with mild symptoms for 2 days. At last visit blood results revealed, CBC was completely normal, metabolic panel was significant only for mildly elevated glucose at 109. Immunoglobulin panel revealed 3-weak gamma migrating paraproteins, - 2 weak IgG kappa bands and 1 weak IgG lambda band. The M spike was too low to be quantitated on the electrophoresis. All other parameters including sed rate, CRP, LDH, uric acid, and viscosity were normal.  These results were very stable and there was no significant change. Since last visit, patient had an exercise stress test and echocardiogram and states both were unremarkable. He complains of muscular spasms in the hands. Otherwise, he feels generally well at this time. Denies fatigue, fever, chills, sweats, headaches, unintentional weight loss, chest pain, shortness of breath, muscle aches or pain, abdominal pain or recent infections.

## 2022-07-05 NOTE — ASSESSMENT
[FreeTextEntry1] : \par \par Patient is a 61 year old male who presents for hematologic follow-up for IgG kappa monoclonal gammopathy of undetermined significance, which has been stable over several years (since 2015) without elevation in the total IgG, and without any effect on blood counts, renal function, or other parameters. He has managed to avoid having bone marrow aspirate and biopsy due to the fact that there has been no significant change over these years.  His last visit the patient had a stress test and an echocardiogram who which were essentially normal.  Have ordered CBC, sedimentation rate, manual differential, comprehensive metabolic panel,  serum protein electrophoresis, immunoelectrophoresis, immunofixation, kappa to lambda ratio, serum free light chains, LDH, C-reactive protein, beta-2 microglobulin, urine immunoelectrophoresis, kappa/lambda light chain in urine, Bence-Hines protein.  We will also check a magnesium level for his complaint of muscular spasms of his hands.  Venipuncture was performed in the office today. . A copy of today's results will be given to his PCP, Dr. Juan Miguel Zhang.. \par \par  \par

## 2022-07-05 NOTE — REVIEW OF SYSTEMS
[Negative] : Allergic/Immunologic [Fever] : no fever [Chills] : no chills [Night Sweats] : no night sweats [Fatigue] : no fatigue [Recent Change In Weight] : ~T no recent weight change [Chest Pain] : no chest pain [Shortness Of Breath] : no shortness of breath [Abdominal Pain] : no abdominal pain [Joint Pain] : no joint pain [Joint Stiffness] : no joint stiffness [Easy Bleeding] : no tendency for easy bleeding [Easy Bruising] : no tendency for easy bruising [FreeTextEntry5] : had recent stress test and echocardiogram, all fine [FreeTextEntry9] : spasms of the hands

## 2022-07-05 NOTE — REASON FOR VISIT
[Follow-Up Visit] : a follow-up visit for [FreeTextEntry2] : IgG monoclonal gammopathy of undetermined significance, IgG kappa and lambda monoclonal gammopathy.  Muscular spasms of hands

## 2022-07-06 LAB
ALBUMIN SERPL ELPH-MCNC: 4.6 G/DL
ALP BLD-CCNC: 63 U/L
ALT SERPL-CCNC: 34 U/L
ANION GAP SERPL CALC-SCNC: 14 MMOL/L
AST SERPL-CCNC: 26 U/L
B2 MICROGLOB SERPL-MCNC: 1.9 MG/L
BASOPHILS # BLD AUTO: 0.1 K/UL
BASOPHILS # BLD AUTO: 0.1 K/UL
BASOPHILS NFR BLD AUTO: 1.7 %
BASOPHILS NFR BLD AUTO: 1.7 %
BILIRUB SERPL-MCNC: 0.6 MG/DL
BUN SERPL-MCNC: 14 MG/DL
CALCIUM SERPL-MCNC: 10 MG/DL
CHLORIDE SERPL-SCNC: 102 MMOL/L
CO2 SERPL-SCNC: 22 MMOL/L
CREAT SERPL-MCNC: 0.99 MG/DL
CRP SERPL-MCNC: <3 MG/L
DEPRECATED KAPPA LC FREE/LAMBDA SER: 1.39 RATIO
EGFR: 87 ML/MIN/1.73M2
EOSINOPHIL # BLD AUTO: 0.15 K/UL
EOSINOPHIL # BLD AUTO: 0.15 K/UL
EOSINOPHIL NFR BLD AUTO: 2.6 %
EOSINOPHIL NFR BLD AUTO: 2.6 %
ERYTHROCYTE [SEDIMENTATION RATE] IN BLOOD BY WESTERGREN METHOD: 4 MM/HR
FREE KAPPA URINE: 19.04 MG/L
FREE KAPPA/LAMDA RATIO: 6.92 RATIO
FREE LAMDA URINE: 2.75 MG/L
GIANT PLATELETS BLD QL SMEAR: PRESENT
GLUCOSE SERPL-MCNC: 99 MG/DL
HCT VFR BLD CALC: 42.6 %
HGB BLD-MCNC: 14.8 G/DL
KAPPA LC CSF-MCNC: 1.25 MG/DL
KAPPA LC SERPL-MCNC: 1.74 MG/DL
LDH SERPL-CCNC: 198 U/L
LYMPHOCYTES # BLD AUTO: 1.42 K/UL
LYMPHOCYTES # BLD AUTO: 1.42 K/UL
LYMPHOCYTES NFR BLD AUTO: 24.4 %
LYMPHOCYTES NFR BLD AUTO: 24.4 %
MAGNESIUM SERPL-MCNC: 2.4 MG/DL
MAN DIFF?: NORMAL
MCHC RBC-ENTMCNC: 30.4 PG
MCHC RBC-ENTMCNC: 34.7 GM/DL
MCV RBC AUTO: 87.5 FL
MONOCYTES # BLD AUTO: 0.45 K/UL
MONOCYTES # BLD AUTO: 0.45 K/UL
MONOCYTES NFR BLD AUTO: 7.8 %
MONOCYTES NFR BLD AUTO: 7.8 %
MSMEAR: NORMAL
NEUTROPHILS # BLD AUTO: 3.69 K/UL
NEUTROPHILS # BLD AUTO: 3.69 K/UL
NEUTROPHILS NFR BLD AUTO: 62.6 %
NEUTROPHILS NFR BLD AUTO: 62.6 %
NEUTS BAND NFR BLD MANUAL: 0.9 %
PLAT MORPH BLD: NORMAL
PLATELET # BLD AUTO: 361 K/UL
POTASSIUM SERPL-SCNC: 4.6 MMOL/L
PROT SERPL-MCNC: 6.9 G/DL
RBC # BLD: 4.87 M/UL
RBC # FLD: 12.7 %
RBC BLD AUTO: NORMAL
SODIUM SERPL-SCNC: 138 MMOL/L
URATE SERPL-MCNC: 7.8 MG/DL
WBC # FLD AUTO: 5.81 K/UL

## 2022-07-08 LAB
ALBUPE: 16.7 %
ALPHA1UPE: 47.1 %
ALPHA2UPE: 20.6 %
BETAUPE: 11.7 %
GAMMAUPE: 3.9 %
IGA 24H UR QL IFE: NORMAL
KAPPA LC 24H UR QL: NORMAL
PROT PATTERN 24H UR ELPH-IMP: NORMAL
PROT UR-MCNC: 10 MG/DL
PROT UR-MCNC: 10 MG/DL

## 2022-07-11 LAB — VISCOSITY, SERUM: 1.8

## 2022-07-13 LAB
ALBUMIN MFR SERPL ELPH: 63.3 %
ALBUMIN SERPL-MCNC: 4.4 G/DL
ALBUMIN/GLOB SERPL: 1.8 RATIO
ALPHA1 GLOB MFR SERPL ELPH: 4.3 %
ALPHA1 GLOB SERPL ELPH-MCNC: 0.3 G/DL
ALPHA2 GLOB MFR SERPL ELPH: 9.2 %
ALPHA2 GLOB SERPL ELPH-MCNC: 0.6 G/DL
B-GLOBULIN MFR SERPL ELPH: 10.3 %
B-GLOBULIN SERPL ELPH-MCNC: 0.7 G/DL
DEPRECATED KAPPA LC FREE/LAMBDA SER: 1.39 RATIO
GAMMA GLOB FLD ELPH-MCNC: 0.9 G/DL
GAMMA GLOB MFR SERPL ELPH: 12.9 %
IGA SER QL IEP: 80 MG/DL
IGG SER QL IEP: 839 MG/DL
IGM SER QL IEP: 89 MG/DL
INTERPRETATION SERPL IEP-IMP: NORMAL
KAPPA LC CSF-MCNC: 1.25 MG/DL
KAPPA LC SERPL-MCNC: 1.74 MG/DL
M PROTEIN MFR SERPL ELPH: NORMAL
M PROTEIN SPEC IFE-MCNC: NORMAL
MONOCLON BAND OBS SERPL: NORMAL
PROT SERPL-MCNC: 6.9 G/DL
PROT SERPL-MCNC: 6.9 G/DL

## 2022-08-22 ENCOUNTER — NON-APPOINTMENT (OUTPATIENT)
Age: 62
End: 2022-08-22

## 2022-11-15 ENCOUNTER — APPOINTMENT (OUTPATIENT)
Dept: HEMATOLOGY ONCOLOGY | Facility: CLINIC | Age: 62
End: 2022-11-15

## 2022-11-15 ENCOUNTER — LABORATORY RESULT (OUTPATIENT)
Age: 62
End: 2022-11-15

## 2022-11-15 VITALS
HEART RATE: 71 BPM | TEMPERATURE: 97.1 F | WEIGHT: 225 LBS | HEIGHT: 72 IN | SYSTOLIC BLOOD PRESSURE: 128 MMHG | DIASTOLIC BLOOD PRESSURE: 82 MMHG | OXYGEN SATURATION: 99 % | BODY MASS INDEX: 30.48 KG/M2

## 2022-11-15 PROCEDURE — 99213 OFFICE O/P EST LOW 20 MIN: CPT | Mod: 25

## 2022-11-15 PROCEDURE — 36415 COLL VENOUS BLD VENIPUNCTURE: CPT

## 2022-11-15 RX ORDER — TOBRAMYCIN AND DEXAMETHASONE 3; 1 MG/ML; MG/ML
0.3-0.1 SUSPENSION/ DROPS OPHTHALMIC
Qty: 5 | Refills: 0 | Status: DISCONTINUED | COMMUNITY
Start: 2022-03-28 | End: 2022-11-15

## 2022-11-15 NOTE — ASSESSMENT
[FreeTextEntry1] : Patient is a 62 year old male who presents for hematologic follow-up for IgG kappa monoclonal gammopathy of undetermined significance (MGUS), which has been stable over several years (since 2015) without elevation in the total IgG, and without any effect on blood counts, renal function, or other parameters. Patient has managed to avoid having bone marrow aspirate and biopsy due to the fact that there has been no significant change over these years. Patient appears clinically stable at this time and will continue monitoring. Have ordered Beta-2-microglobulin, CBC with differential, CMP, CRP, immunoelectrophoresis, immunofixation, immunoglobin free light chains, immunoglobins panel, LDH, manual differential, protein electrophoresis, sed rate, uric acid and viscosity. \par Venipuncture was performed in the office today. Have advised patient to call the office to discuss results and next appointment date.

## 2022-11-15 NOTE — HISTORY OF PRESENT ILLNESS
[de-identified] : Patient is a 62 year old male with history of chronic GERD, COVID-19 in December 2021, and IgG kappa monoclonal gammopathy, which has been stable over several years (since first noted in 2015) who returns for hematologic follow up.  Blood results from the last visit revealed that the immunoelectrophoresis showed an M spike that was too low to be quantitated. Immunofixation revealed the same weak IgG kappa band and 1 weak IgG lambda band. The total immunoglobulin G was normal at 839. IgA was slightly diminished at 80. The kappa to lambda free light chain ratio was normal. Urine electrophoresis revealed absent Bence-Hines concentration and no monoclonal band on immunofixation. CBC was completely normal, CMP was completely normal and all parameters including sed rate, C-reactive protein, LDH, uric acid, and beta-2 microglobulin were normal. Since the last visit, the patient states that he had an ear infection that soon improved after using topical antibiotic medications, and had a pilar cyst on his scalp removed successfully. Today, the patient complains of mild chronic acid reflux that is manageable but is otherwise feeling generally well without any acute complaints. Denies fatigue, fever, chills, sweats, headaches, unintentional weight loss, chest pain, shortness of breath, joint pain, abdominal pain, easy bruising or recent infections. Of note, the patient received the flu vaccine today and is due for a colonoscopy.

## 2022-11-15 NOTE — REVIEW OF SYSTEMS
[Negative] : Allergic/Immunologic [Fever] : no fever [Night Sweats] : no night sweats [Fatigue] : no fatigue [Recent Change In Weight] : ~T no recent weight change [Chest Pain] : no chest pain [Shortness Of Breath] : no shortness of breath [Abdominal Pain] : no abdominal pain [Joint Pain] : no joint pain [Skin Rash] : no skin rash [FreeTextEntry4] : Recent left ear infection treated with antibiotics drops and ointment [FreeTextEntry7] : Has  chronic GERD, managed with Tums

## 2022-11-16 LAB
ALBUMIN SERPL ELPH-MCNC: 4.4 G/DL
ALP BLD-CCNC: 51 U/L
ALT SERPL-CCNC: 27 U/L
ANION GAP SERPL CALC-SCNC: 15 MMOL/L
AST SERPL-CCNC: 21 U/L
B2 MICROGLOB SERPL-MCNC: 1.7 MG/L
BASOPHILS # BLD AUTO: 0 K/UL
BASOPHILS # BLD AUTO: 0 K/UL
BASOPHILS NFR BLD AUTO: 0 %
BASOPHILS NFR BLD AUTO: 0 %
BILIRUB SERPL-MCNC: 0.4 MG/DL
BUN SERPL-MCNC: 11 MG/DL
CALCIUM SERPL-MCNC: 9.4 MG/DL
CHLORIDE SERPL-SCNC: 104 MMOL/L
CO2 SERPL-SCNC: 21 MMOL/L
CREAT SERPL-MCNC: 0.87 MG/DL
CRP SERPL-MCNC: <3 MG/L
DEPRECATED KAPPA LC FREE/LAMBDA SER: 1.85 RATIO
EGFR: 98 ML/MIN/1.73M2
EOSINOPHIL # BLD AUTO: 0.46 K/UL
EOSINOPHIL # BLD AUTO: 0.46 K/UL
EOSINOPHIL NFR BLD AUTO: 7.8 %
EOSINOPHIL NFR BLD AUTO: 7.8 %
ERYTHROCYTE [SEDIMENTATION RATE] IN BLOOD BY WESTERGREN METHOD: 3 MM/HR
GLUCOSE SERPL-MCNC: 87 MG/DL
HCT VFR BLD CALC: 42.5 %
HGB BLD-MCNC: 14.1 G/DL
KAPPA LC CSF-MCNC: 1.04 MG/DL
KAPPA LC SERPL-MCNC: 1.92 MG/DL
LDH SERPL-CCNC: 164 U/L
LYMPHOCYTES # BLD AUTO: 1.14 K/UL
LYMPHOCYTES # BLD AUTO: 1.14 K/UL
LYMPHOCYTES NFR BLD AUTO: 19.1 %
LYMPHOCYTES NFR BLD AUTO: 19.1 %
MAN DIFF?: NORMAL
MCHC RBC-ENTMCNC: 30.3 PG
MCHC RBC-ENTMCNC: 33.2 GM/DL
MCV RBC AUTO: 91.4 FL
MONOCYTES # BLD AUTO: 0.26 K/UL
MONOCYTES # BLD AUTO: 0.26 K/UL
MONOCYTES NFR BLD AUTO: 4.4 %
MONOCYTES NFR BLD AUTO: 4.4 %
MSMEAR: NORMAL
NEUTROPHILS # BLD AUTO: 4.09 K/UL
NEUTROPHILS # BLD AUTO: 4.09 K/UL
NEUTROPHILS NFR BLD AUTO: 68.7 %
NEUTROPHILS NFR BLD AUTO: 68.7 %
PLAT MORPH BLD: NORMAL
PLATELET # BLD AUTO: 277 K/UL
POTASSIUM SERPL-SCNC: 4.2 MMOL/L
PROT SERPL-MCNC: 6.5 G/DL
RBC # BLD: 4.65 M/UL
RBC # FLD: 12.7 %
RBC BLD AUTO: NORMAL
SODIUM SERPL-SCNC: 140 MMOL/L
URATE SERPL-MCNC: 6.6 MG/DL
WBC # FLD AUTO: 5.96 K/UL

## 2022-11-21 LAB
ALBUMIN MFR SERPL ELPH: 64.7 %
ALBUMIN SERPL-MCNC: 4.2 G/DL
ALBUMIN/GLOB SERPL: 1.8 RATIO
ALPHA1 GLOB MFR SERPL ELPH: 4.2 %
ALPHA1 GLOB SERPL ELPH-MCNC: 0.3 G/DL
ALPHA2 GLOB MFR SERPL ELPH: 8.8 %
ALPHA2 GLOB SERPL ELPH-MCNC: 0.6 G/DL
B-GLOBULIN MFR SERPL ELPH: 9.9 %
B-GLOBULIN SERPL ELPH-MCNC: 0.6 G/DL
DEPRECATED KAPPA LC FREE/LAMBDA SER: 1.85 RATIO
GAMMA GLOB FLD ELPH-MCNC: 0.8 G/DL
GAMMA GLOB MFR SERPL ELPH: 12.4 %
IGA SER QL IEP: 68 MG/DL
IGG SER QL IEP: 806 MG/DL
IGM SER QL IEP: 82 MG/DL
INTERPRETATION SERPL IEP-IMP: NORMAL
KAPPA LC CSF-MCNC: 1.04 MG/DL
KAPPA LC SERPL-MCNC: 1.92 MG/DL
M PROTEIN MFR SERPL ELPH: NORMAL
M PROTEIN SPEC IFE-MCNC: NORMAL
MONOCLON BAND OBS SERPL: NORMAL
PROT SERPL-MCNC: 6.5 G/DL
PROT SERPL-MCNC: 6.5 G/DL

## 2022-11-30 ENCOUNTER — NON-APPOINTMENT (OUTPATIENT)
Age: 62
End: 2022-11-30

## 2023-02-28 ENCOUNTER — APPOINTMENT (OUTPATIENT)
Dept: HEMATOLOGY ONCOLOGY | Facility: CLINIC | Age: 63
End: 2023-02-28
Payer: COMMERCIAL

## 2023-02-28 VITALS
HEART RATE: 89 BPM | WEIGHT: 208 LBS | DIASTOLIC BLOOD PRESSURE: 70 MMHG | HEIGHT: 72 IN | TEMPERATURE: 96.3 F | SYSTOLIC BLOOD PRESSURE: 138 MMHG | BODY MASS INDEX: 28.17 KG/M2 | OXYGEN SATURATION: 98 %

## 2023-02-28 PROCEDURE — 99214 OFFICE O/P EST MOD 30 MIN: CPT | Mod: 25

## 2023-02-28 PROCEDURE — 36415 COLL VENOUS BLD VENIPUNCTURE: CPT

## 2023-02-28 NOTE — ASSESSMENT
[FreeTextEntry1] : Patient is a 62 year old male who presents for hematologic follow-up for IgG kappa monoclonal gammopathy of undetermined significance (MGUS), which has been stable over several years (since 2015) without elevation in the total IgG, and without any effect on blood counts, renal function, or other parameters. Patient has managed to defer having bone marrow aspirate and biopsy due to the fact that there has been no significant change over these years. Patient appears clinically stable regarding the monoclonal gammopathy at this time and will continue monitoring.  Recent anemia is a consequence of the gastrointestinal bleeding after a colonic polypectomy and patient may benefit from iron supplementation pending results.  Have ordered B12 and folate, Beta-2-microglobulin, CBC with differential, CMP, CRP, ferritin, immunoelectrophoresis, immunofixation, immunoglobin free light chains, immunoglobins panel, LDH, manual differential, protein electrophoresis, reticulocyte count, sed rate, uric acid and viscosity. Venipuncture was performed in the office today. Have advised patient to call the office to discuss results and next appointment date. \par

## 2023-02-28 NOTE — REVIEW OF SYSTEMS
[Fatigue] : fatigue [Negative] : Allergic/Immunologic [Vision Problems] : no vision problems [Nosebleeds] : no nosebleeds [Chest Pain] : no chest pain [Palpitations] : no palpitations [Shortness Of Breath] : no shortness of breath [Abdominal Pain] : no abdominal pain [Joint Stiffness] : no joint stiffness [Skin Rash] : no skin rash [Easy Bleeding] : no tendency for easy bleeding [Easy Bruising] : no tendency for easy bruising [FreeTextEntry2] : Intentional weight loss of 15 pounds [FreeTextEntry7] : Prescribed Omeprazole for GERD [de-identified] : Recently bled after colonoscopy/polypectomy

## 2023-02-28 NOTE — HISTORY OF PRESENT ILLNESS
[de-identified] : Patient is a 62 year old male with history of chronic GERD, COVID-19 in December 2021, and IgG kappa monoclonal gammopathy, which has been stable over several years (since first noted in 2015) who returns for hematologic follow up. At the last visit in November 2022, the CBC was essentially normal with a hemoglobin of 14.1, hematocrit 42.5, white count of 5.96 and platelet count 277,000. Immunoelectrophoresis revealed 1 weak IgG kappa band in 1 weak IgG lambda band but the M spike was too low to be quantitated. The kappa lambda ratio was 1.85. Comprehensive metabolic panel, sed rate, CRP, beta-2 microglobulin, LDH, and uric acid were normal. Since the last visit, the patient had a colonoscopy and states that 4 benign polyps were removed. After a few days, he experienced bleeding from the biopsy sites and had another colonoscopy during which bleeding sites were clipped. Patient after the bleeding had a hemoglobin dropped to 10. He states he lost 15 pounds intentionally before the colonoscopy and an additional 5 pounds unintentionally during the colonoscopy prep process. He also had an endoscopy for his gastroesophageal reflux and was prescribed omeprazole. Today, the patient complains of fatigue but is otherwise feeling generally well. Denies fever, chills, sweats, headaches, unintentional weight loss, chest pain, shortness of breath, joint pain, abdominal pain, easy bruising or recent infections.

## 2023-03-01 LAB
ALBUMIN SERPL ELPH-MCNC: 4.5 G/DL
ALP BLD-CCNC: 52 U/L
ALT SERPL-CCNC: 19 U/L
ANION GAP SERPL CALC-SCNC: 13 MMOL/L
AST SERPL-CCNC: 20 U/L
B2 MICROGLOB SERPL-MCNC: 2 MG/L
BASOPHILS # BLD AUTO: 0.05 K/UL
BASOPHILS # BLD AUTO: 0.05 K/UL
BASOPHILS NFR BLD AUTO: 0.9 %
BASOPHILS NFR BLD AUTO: 0.9 %
BILIRUB SERPL-MCNC: 0.4 MG/DL
BUN SERPL-MCNC: 8 MG/DL
CALCIUM SERPL-MCNC: 9.8 MG/DL
CHLORIDE SERPL-SCNC: 104 MMOL/L
CO2 SERPL-SCNC: 25 MMOL/L
CREAT SERPL-MCNC: 0.85 MG/DL
CRP SERPL-MCNC: <3 MG/L
DACRYOCYTES BLD QL SMEAR: SLIGHT
EGFR: 98 ML/MIN/1.73M2
EOSINOPHIL # BLD AUTO: 0 K/UL
EOSINOPHIL # BLD AUTO: 0 K/UL
EOSINOPHIL NFR BLD AUTO: 0 %
EOSINOPHIL NFR BLD AUTO: 0 %
ERYTHROCYTE [SEDIMENTATION RATE] IN BLOOD BY WESTERGREN METHOD: 4 MM/HR
FERRITIN SERPL-MCNC: 41 NG/ML
FOLATE SERPL-MCNC: >20 NG/ML
GLUCOSE SERPL-MCNC: 77 MG/DL
HCT VFR BLD CALC: 38.8 %
HGB BLD-MCNC: 12.2 G/DL
IRON SATN MFR SERPL: 14 %
IRON SERPL-MCNC: 49 UG/DL
LDH SERPL-CCNC: 160 U/L
LYMPHOCYTES # BLD AUTO: 1.05 K/UL
LYMPHOCYTES # BLD AUTO: 1.05 K/UL
LYMPHOCYTES NFR BLD AUTO: 18.4 %
LYMPHOCYTES NFR BLD AUTO: 18.4 %
MAN DIFF?: NORMAL
MCHC RBC-ENTMCNC: 30.3 PG
MCHC RBC-ENTMCNC: 31.4 GM/DL
MCV RBC AUTO: 96.3 FL
MONOCYTES # BLD AUTO: 0.35 K/UL
MONOCYTES # BLD AUTO: 0.35 K/UL
MONOCYTES NFR BLD AUTO: 6.1 %
MONOCYTES NFR BLD AUTO: 6.1 %
MSMEAR: NORMAL
MYELOCYTES NFR BLD: 0.9 %
NEUTROPHILS # BLD AUTO: 4.19 K/UL
NEUTROPHILS # BLD AUTO: 4.19 K/UL
NEUTROPHILS NFR BLD AUTO: 72.8 %
NEUTROPHILS NFR BLD AUTO: 72.8 %
NEUTS BAND NFR BLD MANUAL: 0.9 %
PLAT MORPH BLD: ABNORMAL
PLATELET # BLD AUTO: 277 K/UL
POIKILOCYTOSIS BLD QL SMEAR: SLIGHT
POLYCHROMASIA BLD QL SMEAR: SLIGHT
POTASSIUM SERPL-SCNC: 4.3 MMOL/L
PROT SERPL-MCNC: 6.3 G/DL
RBC # BLD: 4.03 M/UL
RBC # BLD: 4.03 M/UL
RBC # FLD: 13.8 %
RBC BLD AUTO: ABNORMAL
RETICS # AUTO: 2 %
RETICS AGGREG/RBC NFR: 81 K/UL
SODIUM SERPL-SCNC: 142 MMOL/L
SPHEROCYTES BLD QL SMEAR: SLIGHT
TIBC SERPL-MCNC: 356 UG/DL
UIBC SERPL-MCNC: 307 UG/DL
URATE SERPL-MCNC: 5.6 MG/DL
VIT B12 SERPL-MCNC: 492 PG/ML
WBC # FLD AUTO: 5.68 K/UL

## 2023-03-06 LAB — VISCOSITY, SERUM: 1

## 2023-03-08 LAB
ALBUMIN MFR SERPL ELPH: 61.5 %
ALBUMIN SERPL-MCNC: 4 G/DL
ALBUMIN/GLOB SERPL: 1.6 RATIO
ALPHA1 GLOB MFR SERPL ELPH: 5.1 %
ALPHA1 GLOB SERPL ELPH-MCNC: 0.3 G/DL
ALPHA2 GLOB MFR SERPL ELPH: 9.8 %
ALPHA2 GLOB SERPL ELPH-MCNC: 0.6 G/DL
B-GLOBULIN MFR SERPL ELPH: 11 %
B-GLOBULIN SERPL ELPH-MCNC: 0.7 G/DL
DEPRECATED KAPPA LC FREE/LAMBDA SER: 2.14 RATIO
GAMMA GLOB FLD ELPH-MCNC: 0.8 G/DL
GAMMA GLOB MFR SERPL ELPH: 12.6 %
IGA SER QL IEP: 77 MG/DL
IGG SER QL IEP: 788 MG/DL
IGM SER QL IEP: 78 MG/DL
INTERPRETATION SERPL IEP-IMP: NORMAL
KAPPA LC CSF-MCNC: 0.96 MG/DL
KAPPA LC SERPL-MCNC: 2.05 MG/DL
M PROTEIN MFR SERPL ELPH: NORMAL
M PROTEIN SPEC IFE-MCNC: NORMAL
MONOCLON BAND OBS SERPL: NORMAL
PROT SERPL-MCNC: 6.5 G/DL
PROT SERPL-MCNC: 6.5 G/DL

## 2023-04-03 ENCOUNTER — NON-APPOINTMENT (OUTPATIENT)
Age: 63
End: 2023-04-03

## 2023-06-26 ENCOUNTER — APPOINTMENT (OUTPATIENT)
Dept: HEMATOLOGY ONCOLOGY | Facility: CLINIC | Age: 63
End: 2023-06-26

## 2023-07-19 ENCOUNTER — APPOINTMENT (OUTPATIENT)
Dept: HEMATOLOGY ONCOLOGY | Facility: CLINIC | Age: 63
End: 2023-07-19
Payer: COMMERCIAL

## 2023-07-19 VITALS
WEIGHT: 202 LBS | TEMPERATURE: 96.1 F | BODY MASS INDEX: 27.36 KG/M2 | HEART RATE: 69 BPM | DIASTOLIC BLOOD PRESSURE: 80 MMHG | SYSTOLIC BLOOD PRESSURE: 122 MMHG | HEIGHT: 72 IN | OXYGEN SATURATION: 98 %

## 2023-07-19 DIAGNOSIS — D50.0 IRON DEFICIENCY ANEMIA SECONDARY TO BLOOD LOSS (CHRONIC): ICD-10-CM

## 2023-07-19 DIAGNOSIS — R53.83 OTHER FATIGUE: ICD-10-CM

## 2023-07-19 LAB
ERYTHROCYTE [SEDIMENTATION RATE] IN BLOOD BY WESTERGREN METHOD: 2 MM/HR
RBC # BLD: 4.5 M/UL
RETICS # AUTO: 1.5 %
RETICS AGGREG/RBC NFR: 68.9 K/UL

## 2023-07-19 PROCEDURE — 36415 COLL VENOUS BLD VENIPUNCTURE: CPT

## 2023-07-19 PROCEDURE — 99214 OFFICE O/P EST MOD 30 MIN: CPT | Mod: 25

## 2023-07-19 NOTE — REVIEW OF SYSTEMS
[Recent Change In Weight] : ~T recent weight change [Negative] : Allergic/Immunologic [Fever] : no fever [Chills] : no chills [Night Sweats] : no night sweats [Fatigue] : no fatigue [Vision Problems] : no vision problems [Nosebleeds] : no nosebleeds [Chest Pain] : no chest pain [Shortness Of Breath] : no shortness of breath [Abdominal Pain] : no abdominal pain [Joint Pain] : no joint pain [Dizziness] : no dizziness [Easy Bleeding] : no tendency for easy bleeding [Easy Bruising] : no tendency for easy bruising [FreeTextEntry2] : Lost 6 pounds intentionally since the last visit [FreeTextEntry7] : No blood in stool [FreeTextEntry9] : No bone pain

## 2023-07-19 NOTE — HISTORY OF PRESENT ILLNESS
[de-identified] : Patient is a 62 year old male with history of chronic GERD, COVID-19 in December 2021, and IgG kappa monoclonal gammopathy, which has been stable over several years (since first noted in 2015) who returns for hematologic follow up. At the last visit in February, the CBC was significant for a hemoglobin of 12.2 which was better than the hemoglobin after the patient's post colonoscopy GI bleed. Iron stores were at the low end of normal. White count and platelet count were normal. Immunoelectrophoresis revealed 2 weak IgG kappa bands in 1 week IgG G lambda band on immunofixation but the M spike was still too low to be quantitated. Total amount of IgG remains in the normal range and IgA was slightly diminished at 77. Kappa to lambda ratio was slightly elevated at 2.14. All other parameters including CMP, beta-2 microglobulin, LDH, uric acid and C-reactive protein were normal. Patient has lost 6 pounds intentionally since the last visit. He states that he is maintaining a healthy diet and is active by walking/hiking. Today, the patient is doing generally well with no acute complaints. Patient is no longer supplementing with iron. Patient says his reflux has improved. Denies fever, chills, sweats, headaches, unintentional weight loss, chest pain, shortness of breath, joint pain, abdominal pain, easy bruising or recent infections. \par

## 2023-07-19 NOTE — REASON FOR VISIT
[Follow-Up Visit] : a follow-up visit for [FreeTextEntry2] : IgG kappa monoclonal gammopathy of undetermined significance, anemia

## 2023-07-19 NOTE — ASSESSMENT
[FreeTextEntry1] : Patient is a 62 year old male who presents for hematologic follow-up for IgG kappa monoclonal gammopathy of undetermined significance (MGUS), which has been stable over several years (since 2015) without elevation in the total IgG, and without any effect on blood counts, renal function, or other parameters. Patient has managed to defer having bone marrow aspirate and biopsy due to the fact that there has been no significant change over these years. Patient appears clinically stable at this time and will continue monitoring. Have ordered Bence Hines/Protein urine, Free Kappa/Lambda urine, immunofixation urine, lipid profile, Protein electrophoresis, B12 and folate, B2MG, CBC with differential, CRP, ferritin, Immunoelectrophoresis, immunofixation serum, immunoglobulin FLC, immunoglobulin panel, iron panel, LDH, manual differential, protein electrophoresis, reticulocyte count, sed rate and uric acid.  Venipuncture was performed in the office today. Have advised patient to call the office to discuss results and further management.  Copy of the results will be given to his PCP, Dr. Zhang.

## 2023-07-20 LAB
ALBUMIN SERPL ELPH-MCNC: 4.5 G/DL
ALP BLD-CCNC: 51 U/L
ALT SERPL-CCNC: 22 U/L
ANION GAP SERPL CALC-SCNC: 14 MMOL/L
AST SERPL-CCNC: 23 U/L
B2 MICROGLOB SERPL-MCNC: 1.7 MG/L
BASOPHILS # BLD AUTO: 0.05 K/UL
BASOPHILS NFR BLD AUTO: 0.9 %
BILIRUB SERPL-MCNC: 0.5 MG/DL
BUN SERPL-MCNC: 11 MG/DL
CALCIUM SERPL-MCNC: 9.5 MG/DL
CHLORIDE SERPL-SCNC: 103 MMOL/L
CHOLEST SERPL-MCNC: 150 MG/DL
CO2 SERPL-SCNC: 22 MMOL/L
CREAT SERPL-MCNC: 0.82 MG/DL
CRP SERPL-MCNC: <3 MG/L
EGFR: 99 ML/MIN/1.73M2
EOSINOPHIL # BLD AUTO: 0 K/UL
EOSINOPHIL NFR BLD AUTO: 0 %
FERRITIN SERPL-MCNC: 63 NG/ML
FOLATE SERPL-MCNC: >20 NG/ML
FREE KAPPA URINE: 3.7 MG/L
FREE KAPPA/LAMDA RATIO: NORMAL
FREE LAMDA URINE: <0.74 MG/L
GLUCOSE SERPL-MCNC: 88 MG/DL
HDLC SERPL-MCNC: 51 MG/DL
IRON SATN MFR SERPL: 26 %
IRON SERPL-MCNC: 91 UG/DL
LDH SERPL-CCNC: 149 U/L
LDLC SERPL CALC-MCNC: 81 MG/DL
LYMPHOCYTES # BLD AUTO: 1.51 K/UL
LYMPHOCYTES NFR BLD AUTO: 29.8 %
MONOCYTES # BLD AUTO: 0.31 K/UL
MONOCYTES NFR BLD AUTO: 6.1 %
MSMEAR: NORMAL
NEUTROPHILS # BLD AUTO: 3.2 K/UL
NEUTROPHILS NFR BLD AUTO: 63.2 %
NONHDLC SERPL-MCNC: 99 MG/DL
PLAT MORPH BLD: NORMAL
POTASSIUM SERPL-SCNC: 4.7 MMOL/L
PROT SERPL-MCNC: 6.8 G/DL
RBC BLD AUTO: NORMAL
SODIUM SERPL-SCNC: 139 MMOL/L
TIBC SERPL-MCNC: 352 UG/DL
TRIGL SERPL-MCNC: 96 MG/DL
UIBC SERPL-MCNC: 260 UG/DL
URATE SERPL-MCNC: 6.3 MG/DL
VIT B12 SERPL-MCNC: 491 PG/ML

## 2023-07-21 LAB
ALBUMIN MFR SERPL ELPH: 65.8 %
ALBUMIN SERPL-MCNC: 4.5 G/DL
ALBUMIN/GLOB SERPL: 2 RATIO
ALBUPE: 17.5 %
ALPHA1 GLOB MFR SERPL ELPH: 3.9 %
ALPHA1 GLOB SERPL ELPH-MCNC: 0.3 G/DL
ALPHA1UPE: 32.4 %
ALPHA2 GLOB MFR SERPL ELPH: 7.9 %
ALPHA2 GLOB SERPL ELPH-MCNC: 0.5 G/DL
ALPHA2UPE: 22.9 %
B-GLOBULIN MFR SERPL ELPH: 9.9 %
B-GLOBULIN SERPL ELPH-MCNC: 0.7 G/DL
BETAUPE: 11 %
DEPRECATED KAPPA LC FREE/LAMBDA SER: 2.02 RATIO
GAMMA GLOB FLD ELPH-MCNC: 0.8 G/DL
GAMMA GLOB MFR SERPL ELPH: 12.5 %
GAMMAUPE: 16.2 %
IGA 24H UR QL IFE: NORMAL
IGA SER QL IEP: 90 MG/DL
IGG SER QL IEP: 842 MG/DL
IGM SER QL IEP: 86 MG/DL
INTERPRETATION SERPL IEP-IMP: NORMAL
KAPPA LC 24H UR QL: NORMAL
KAPPA LC CSF-MCNC: 1.05 MG/DL
KAPPA LC SERPL-MCNC: 2.12 MG/DL
M PROTEIN MFR SERPL ELPH: NORMAL
M PROTEIN SPEC IFE-MCNC: NORMAL
MONOCLON BAND OBS SERPL: NORMAL
PROT PATTERN 24H UR ELPH-IMP: NORMAL
PROT SERPL-MCNC: 6.8 G/DL
PROT SERPL-MCNC: 6.8 G/DL
PROT UR-MCNC: 4 MG/DL
PROT UR-MCNC: 4 MG/DL

## 2023-08-10 ENCOUNTER — NON-APPOINTMENT (OUTPATIENT)
Age: 63
End: 2023-08-10

## 2023-11-15 NOTE — ASSESSMENT
[FreeTextEntry1] : Patient is a 59 year old male who is here for follow up for IgG kappa monoclonal gammopathy,  which has been stable over several years and fatigue.. The patient has no new complaints. He is scheduled for his annual exam with Dr. Zhang in the upcoming weeks.\par Have ordered CBC,Sedimentation rate, CMP, lipid panel, Protein electrophoresis, Immunoelectrophoresis with immunofixation, viscosity, LDH, Uric acid, CRP, Beta 2 microglobulin. A copy of results will be given to Dr. Zhang. If stable, patient to return in 3-4 months.\par \par  Awake

## 2023-12-11 ENCOUNTER — APPOINTMENT (OUTPATIENT)
Dept: HEMATOLOGY ONCOLOGY | Facility: CLINIC | Age: 63
End: 2023-12-11
Payer: COMMERCIAL

## 2023-12-11 VITALS
TEMPERATURE: 97.1 F | HEIGHT: 72 IN | DIASTOLIC BLOOD PRESSURE: 84 MMHG | WEIGHT: 203 LBS | HEART RATE: 77 BPM | SYSTOLIC BLOOD PRESSURE: 162 MMHG | OXYGEN SATURATION: 99 % | BODY MASS INDEX: 27.5 KG/M2

## 2023-12-11 DIAGNOSIS — D47.2 MONOCLONAL GAMMOPATHY: ICD-10-CM

## 2023-12-11 DIAGNOSIS — Z23 ENCOUNTER FOR IMMUNIZATION: ICD-10-CM

## 2023-12-11 LAB
RBC # BLD: 4.71 M/UL
RETICS # AUTO: 1.2 %
RETICS AGGREG/RBC NFR: 57 K/UL

## 2023-12-11 PROCEDURE — 99214 OFFICE O/P EST MOD 30 MIN: CPT | Mod: 25

## 2023-12-11 PROCEDURE — 90471 IMMUNIZATION ADMIN: CPT

## 2023-12-11 PROCEDURE — 36415 COLL VENOUS BLD VENIPUNCTURE: CPT

## 2023-12-11 PROCEDURE — 90686 IIV4 VACC NO PRSV 0.5 ML IM: CPT

## 2023-12-12 LAB
ALBUMIN SERPL ELPH-MCNC: 4.5 G/DL
ALP BLD-CCNC: 58 U/L
ALT SERPL-CCNC: 25 U/L
ANION GAP SERPL CALC-SCNC: 15 MMOL/L
AST SERPL-CCNC: 24 U/L
B2 MICROGLOB SERPL-MCNC: 1.6 MG/L
BASOPHILS # BLD AUTO: 0.18 K/UL
BASOPHILS # BLD AUTO: 0.18 K/UL
BASOPHILS NFR BLD AUTO: 3.5 %
BASOPHILS NFR BLD AUTO: 3.5 %
BILIRUB SERPL-MCNC: 0.4 MG/DL
BUN SERPL-MCNC: 13 MG/DL
CALCIUM SERPL-MCNC: 9.6 MG/DL
CHLORIDE SERPL-SCNC: 104 MMOL/L
CO2 SERPL-SCNC: 22 MMOL/L
CREAT SERPL-MCNC: 0.86 MG/DL
CRP SERPL-MCNC: <3 MG/L
EGFR: 97 ML/MIN/1.73M2
EOSINOPHIL # BLD AUTO: 0 K/UL
EOSINOPHIL # BLD AUTO: 0 K/UL
EOSINOPHIL NFR BLD AUTO: 0 %
EOSINOPHIL NFR BLD AUTO: 0 %
ERYTHROCYTE [SEDIMENTATION RATE] IN BLOOD BY WESTERGREN METHOD: 2 MM/HR
FERRITIN SERPL-MCNC: 60 NG/ML
FOLATE SERPL-MCNC: >20 NG/ML
GLUCOSE SERPL-MCNC: 103 MG/DL
HCT VFR BLD CALC: 43.1 %
HGB BLD-MCNC: 14.8 G/DL
IRON SATN MFR SERPL: 49 %
IRON SERPL-MCNC: 154 UG/DL
LDH SERPL-CCNC: 167 U/L
LYMPHOCYTES # BLD AUTO: 1.24 K/UL
LYMPHOCYTES # BLD AUTO: 1.24 K/UL
LYMPHOCYTES NFR BLD AUTO: 23.7 %
LYMPHOCYTES NFR BLD AUTO: 23.7 %
MAN DIFF?: NORMAL
MCHC RBC-ENTMCNC: 31.4 PG
MCHC RBC-ENTMCNC: 34.3 GM/DL
MCV RBC AUTO: 91.5 FL
MONOCYTES # BLD AUTO: 0.27 K/UL
MONOCYTES # BLD AUTO: 0.27 K/UL
MONOCYTES NFR BLD AUTO: 5.2 %
MONOCYTES NFR BLD AUTO: 5.2 %
MSMEAR: NORMAL
NEUTROPHILS # BLD AUTO: 3.54 K/UL
NEUTROPHILS # BLD AUTO: 3.54 K/UL
NEUTROPHILS NFR BLD AUTO: 66.7 %
NEUTROPHILS NFR BLD AUTO: 66.7 %
NEUTS BAND NFR BLD MANUAL: 0.9 %
PLAT MORPH BLD: NORMAL
PLATELET # BLD AUTO: 288 K/UL
POTASSIUM SERPL-SCNC: 4.2 MMOL/L
PROT SERPL-MCNC: 6.6 G/DL
RBC # BLD: 4.71 M/UL
RBC # FLD: 12.3 %
RBC BLD AUTO: NORMAL
SODIUM SERPL-SCNC: 142 MMOL/L
TIBC SERPL-MCNC: 315 UG/DL
UIBC SERPL-MCNC: 161 UG/DL
URATE SERPL-MCNC: 5.6 MG/DL
VIT B12 SERPL-MCNC: 547 PG/ML
WBC # FLD AUTO: 5.23 K/UL

## 2023-12-13 LAB
ALBUMIN MFR SERPL ELPH: 64.4 %
ALBUMIN SERPL-MCNC: 4.3 G/DL
ALBUMIN/GLOB SERPL: 1.9 RATIO
ALPHA1 GLOB MFR SERPL ELPH: 3.9 %
ALPHA1 GLOB SERPL ELPH-MCNC: 0.3 G/DL
ALPHA2 GLOB MFR SERPL ELPH: 8.3 %
ALPHA2 GLOB SERPL ELPH-MCNC: 0.5 G/DL
B-GLOBULIN MFR SERPL ELPH: 10.2 %
B-GLOBULIN SERPL ELPH-MCNC: 0.7 G/DL
DEPRECATED KAPPA LC FREE/LAMBDA SER: 2.06 RATIO
GAMMA GLOB FLD ELPH-MCNC: 0.9 G/DL
GAMMA GLOB MFR SERPL ELPH: 13.2 %
IGA SER QL IEP: 102 MG/DL
IGG SER QL IEP: 846 MG/DL
IGM SER QL IEP: 85 MG/DL
INTERPRETATION SERPL IEP-IMP: NORMAL
KAPPA LC CSF-MCNC: 1 MG/DL
KAPPA LC SERPL-MCNC: 2.06 MG/DL
M PROTEIN MFR SERPL ELPH: NORMAL
M PROTEIN SPEC IFE-MCNC: NORMAL
MONOCLON BAND OBS SERPL: NORMAL
PROT SERPL-MCNC: 6.6 G/DL
PROT SERPL-MCNC: 6.6 G/DL

## 2023-12-14 LAB — VISCOSITY, SERUM: 1.6

## 2024-01-04 ENCOUNTER — NON-APPOINTMENT (OUTPATIENT)
Age: 64
End: 2024-01-04

## 2024-05-28 ENCOUNTER — APPOINTMENT (OUTPATIENT)
Dept: HEMATOLOGY ONCOLOGY | Facility: CLINIC | Age: 64
End: 2024-05-28
Payer: COMMERCIAL

## 2024-05-28 VITALS
TEMPERATURE: 97.4 F | DIASTOLIC BLOOD PRESSURE: 90 MMHG | HEIGHT: 72 IN | HEART RATE: 77 BPM | OXYGEN SATURATION: 98 % | WEIGHT: 202 LBS | SYSTOLIC BLOOD PRESSURE: 132 MMHG | BODY MASS INDEX: 27.36 KG/M2

## 2024-05-28 DIAGNOSIS — E78.00 PURE HYPERCHOLESTEROLEMIA, UNSPECIFIED: ICD-10-CM

## 2024-05-28 DIAGNOSIS — D47.2 MONOCLONAL GAMMOPATHY: ICD-10-CM

## 2024-05-28 PROCEDURE — 36415 COLL VENOUS BLD VENIPUNCTURE: CPT

## 2024-05-28 PROCEDURE — 99214 OFFICE O/P EST MOD 30 MIN: CPT | Mod: 25

## 2024-05-28 NOTE — REASON FOR VISIT
[Follow-Up Visit] : a follow-up visit for [FreeTextEntry2] : IgG monoclonal gammopathy of undetermined significance, MGUS

## 2024-05-28 NOTE — ASSESSMENT
[FreeTextEntry1] : Patient is a 63 year old male who presents for hematologic follow-up for IgG kappa monoclonal gammopathy of undetermined significance (MGUS), which has been stable over several years (since 2015) without elevation in the total IgG, and without any effect on blood counts, renal function, or other parameters. Patient has managed to defer having bone marrow aspirate and biopsy due to the fact that there has been no significant change over these years. Patient appears clinically stable at this time and will continue monitoring.  Have ordered Lipid Profile, B12 and folate, B2MG, CBC with auto differential, CMP, CRP, ferritin, Free Kappa/Lambda urine, Immunoelectrophoresis, immunofixation, immunoglobulin FLC, immunoglobulins panel, iron panel, LDH, manual differential, protein electrophoresis random urine and serum, reticulocyte count, sed rate, uric acid and viscosity. Venipuncture was performed in the office today. Have advised patient to call the office to discuss results and further management.

## 2024-05-28 NOTE — REVIEW OF SYSTEMS
[Negative] : Allergic/Immunologic [Fever] : no fever [Chills] : no chills [Night Sweats] : no night sweats [Fatigue] : no fatigue [Recent Change In Weight] : ~T no recent weight change [Vision Problems] : no vision problems [Nosebleeds] : no nosebleeds [Chest Pain] : no chest pain [Shortness Of Breath] : no shortness of breath [Abdominal Pain] : no abdominal pain [Joint Pain] : no joint pain [Joint Stiffness] : no joint stiffness [Skin Rash] : no skin rash [Dizziness] : no dizziness [Easy Bleeding] : no tendency for easy bleeding [Easy Bruising] : no tendency for easy bruising [Swollen Glands] : no swollen glands

## 2024-05-28 NOTE — HISTORY OF PRESENT ILLNESS
[de-identified] : Patient is a 63 year old male with history of chronic GERD, COVID-19 in December 2022, and IgG kappa monoclonal gammopathy, which has been stable over several years (since first noted in 2015) who returns for hematologic follow up. At the last visit in December 2023, the Immunoelectrophoresis was very stable with one  IgG kappa band and one weak IgG lambda band, quantities were very small to be quantitated. Kappa to lambda ratio was slightly elevated at 2.06. Comprehensive metabolic panel was significant for a very normal creatinine and slightly elevated glucose at 103. CBC was completely normal with a good hemoglobin of 14.8. All other parameters including beta-2 microglobulin, C-reactive protein, LDH, uric acid, B12, folate ferritin, iron panel and sed rate were normal. Patient's weight is stable, and he continues to maintain a heathy diet. Today, the patient is feeling generally well without any acute complaints. Patient states his reflux has since improved while being on a healthier diet. Denies fever, chills, drenching night sweats, headaches, unintentional weight loss, chest pain, shortness of breath, joint pain, abdominal pain, easy bruising or recent infections.

## 2024-05-29 LAB
ALBUMIN SERPL ELPH-MCNC: 4.7 G/DL
ALP BLD-CCNC: 56 U/L
ALT SERPL-CCNC: 19 U/L
ANION GAP SERPL CALC-SCNC: 15 MMOL/L
AST SERPL-CCNC: 19 U/L
B2 MICROGLOB SERPL-MCNC: 1.6 MG/L
BASOPHILS # BLD AUTO: 0.07 K/UL
BASOPHILS # BLD AUTO: 0.07 K/UL
BASOPHILS NFR BLD AUTO: 0.9 %
BASOPHILS NFR BLD AUTO: 0.9 %
BILIRUB SERPL-MCNC: 0.4 MG/DL
BUN SERPL-MCNC: 13 MG/DL
CALCIUM SERPL-MCNC: 9.8 MG/DL
CHLORIDE SERPL-SCNC: 104 MMOL/L
CHOLEST SERPL-MCNC: 132 MG/DL
CO2 SERPL-SCNC: 22 MMOL/L
CREAT SERPL-MCNC: 0.9 MG/DL
CRP SERPL-MCNC: <3 MG/L
EGFR: 96 ML/MIN/1.73M2
EOSINOPHIL # BLD AUTO: 0.38 K/UL
EOSINOPHIL # BLD AUTO: 0.38 K/UL
EOSINOPHIL NFR BLD AUTO: 5.2 %
EOSINOPHIL NFR BLD AUTO: 5.2 %
ERYTHROCYTE [SEDIMENTATION RATE] IN BLOOD BY WESTERGREN METHOD: 3 MM/HR
FERRITIN SERPL-MCNC: 37 NG/ML
FOLATE SERPL-MCNC: >20 NG/ML
FREE KAPPA URINE: 18.5 MG/L
FREE KAPPA/LAMDA RATIO: 7.46 RATIO
FREE LAMDA URINE: 2.48 MG/L
GLUCOSE SERPL-MCNC: 131 MG/DL
HCT VFR BLD CALC: 42.8 %
HDLC SERPL-MCNC: 46 MG/DL
HGB BLD-MCNC: 14.9 G/DL
IRON SATN MFR SERPL: 25 %
IRON SERPL-MCNC: 91 UG/DL
LDH SERPL-CCNC: 162 U/L
LDLC SERPL CALC-MCNC: 55 MG/DL
LYMPHOCYTES # BLD AUTO: 1.13 K/UL
LYMPHOCYTES # BLD AUTO: 1.13 K/UL
LYMPHOCYTES NFR BLD AUTO: 15.5 %
LYMPHOCYTES NFR BLD AUTO: 15.5 %
MAN DIFF?: NORMAL
MCHC RBC-ENTMCNC: 31.2 PG
MCHC RBC-ENTMCNC: 34.8 GM/DL
MCV RBC AUTO: 89.5 FL
MONOCYTES # BLD AUTO: 0.44 K/UL
MONOCYTES # BLD AUTO: 0.44 K/UL
MONOCYTES NFR BLD AUTO: 6 %
MONOCYTES NFR BLD AUTO: 6 %
MSMEAR: NORMAL
NEUTROPHILS # BLD AUTO: 5.26 K/UL
NEUTROPHILS # BLD AUTO: 5.26 K/UL
NEUTROPHILS NFR BLD AUTO: 72.4 %
NEUTROPHILS NFR BLD AUTO: 72.4 %
NONHDLC SERPL-MCNC: 86 MG/DL
PLAT MORPH BLD: ABNORMAL
PLATELET # BLD AUTO: 306 K/UL
POTASSIUM SERPL-SCNC: 4.1 MMOL/L
PROT SERPL-MCNC: 7 G/DL
RBC # BLD: 4.78 M/UL
RBC # BLD: 4.78 M/UL
RBC # FLD: 12.2 %
RBC BLD AUTO: NORMAL
RETICS # AUTO: 1.1 %
RETICS AGGREG/RBC NFR: 51.6 K/UL
SMUDGE CELLS # BLD: PRESENT
SODIUM SERPL-SCNC: 141 MMOL/L
TIBC SERPL-MCNC: 369 UG/DL
TRIGL SERPL-MCNC: 195 MG/DL
UIBC SERPL-MCNC: 278 UG/DL
URATE SERPL-MCNC: 6.1 MG/DL
VIT B12 SERPL-MCNC: 638 PG/ML
WBC # FLD AUTO: 7.26 K/UL

## 2024-05-30 LAB
ALBUPE: 17.5 %
ALPHA1UPE: 34.9 %
ALPHA2UPE: 21.1 %
BETAUPE: 15.6 %
GAMMAUPE: 10.9 %
IGA 24H UR QL IFE: NORMAL
KAPPA LC 24H UR QL: NORMAL
PROT PATTERN 24H UR ELPH-IMP: NORMAL
PROT UR-MCNC: 11 MG/DL
PROT UR-MCNC: 11 MG/DL

## 2024-05-31 LAB
ALBUMIN MFR SERPL ELPH: 63.5 %
ALBUMIN SERPL-MCNC: 4.4 G/DL
ALBUMIN/GLOB SERPL: 1.7 RATIO
ALPHA1 GLOB MFR SERPL ELPH: 4.3 %
ALPHA1 GLOB SERPL ELPH-MCNC: 0.3 G/DL
ALPHA2 GLOB MFR SERPL ELPH: 9.3 %
ALPHA2 GLOB SERPL ELPH-MCNC: 0.7 G/DL
B-GLOBULIN MFR SERPL ELPH: 10.6 %
B-GLOBULIN SERPL ELPH-MCNC: 0.7 G/DL
DEPRECATED KAPPA LC FREE/LAMBDA SER: 1.74 RATIO
GAMMA GLOB FLD ELPH-MCNC: 0.9 G/DL
GAMMA GLOB MFR SERPL ELPH: 12.3 %
IGA SER QL IEP: 91 MG/DL
IGG SER QL IEP: 731 MG/DL
IGM SER QL IEP: 76 MG/DL
INTERPRETATION SERPL IEP-IMP: NORMAL
KAPPA LC CSF-MCNC: 0.89 MG/DL
KAPPA LC SERPL-MCNC: 1.55 MG/DL
M PROTEIN MFR SERPL ELPH: NORMAL
M PROTEIN SPEC IFE-MCNC: NORMAL
MONOCLON BAND OBS SERPL: NORMAL
PROT SERPL-MCNC: 7 G/DL
PROT SERPL-MCNC: 7 G/DL

## 2024-06-04 LAB — VISCOSITY, SERUM: 1.6

## 2024-07-15 ENCOUNTER — NON-APPOINTMENT (OUTPATIENT)
Age: 64
End: 2024-07-15

## 2024-09-10 ENCOUNTER — APPOINTMENT (OUTPATIENT)
Dept: ORTHOPEDIC SURGERY | Facility: CLINIC | Age: 64
End: 2024-09-10

## 2024-09-11 ENCOUNTER — APPOINTMENT (OUTPATIENT)
Dept: ORTHOPEDIC SURGERY | Facility: CLINIC | Age: 64
End: 2024-09-11
Payer: COMMERCIAL

## 2024-09-11 VITALS — BODY MASS INDEX: 26.41 KG/M2 | HEIGHT: 72 IN | WEIGHT: 195 LBS

## 2024-09-11 DIAGNOSIS — M75.112 INCOMPLETE ROTATOR CUFF TEAR OR RUPTURE OF LEFT SHOULDER, NOT SPECIFIED AS TRAUMATIC: ICD-10-CM

## 2024-09-11 DIAGNOSIS — M25.512 PAIN IN LEFT SHOULDER: ICD-10-CM

## 2024-09-11 DIAGNOSIS — M75.22 BICIPITAL TENDINITIS, LEFT SHOULDER: ICD-10-CM

## 2024-09-11 DIAGNOSIS — M25.522 PAIN IN LEFT ELBOW: ICD-10-CM

## 2024-09-11 PROCEDURE — 73080 X-RAY EXAM OF ELBOW: CPT | Mod: LT

## 2024-09-11 PROCEDURE — 99204 OFFICE O/P NEW MOD 45 MIN: CPT

## 2024-09-11 PROCEDURE — 73030 X-RAY EXAM OF SHOULDER: CPT | Mod: LT

## 2024-09-11 NOTE — DISCUSSION/SUMMARY
[de-identified] :  Assessment:   The patient is a 63 year old male with physical exam findings consistent with LEFT  SHOULDER IMPINGEMENT AND LEFT ELBOW LE AND BT     - We discussed their diagnosis and treatment options at length including the risks and benefits of both surgical and non-surgical options. - We will continue conservative treatment with a course of PT, icing, and anti-inflammatory medication. - The patient was provided with a prescription to work on scapular strengthening and rotator cuff strengthening. - The patient was advised to let pain guide the gradual advancement of activities.   Follow up as needed in 6 weeks to re-evaluate progress with therapy

## 2024-09-11 NOTE — HISTORY OF PRESENT ILLNESS
[de-identified] : The patient is a 63 year old M, LEFThand dominant who presents today complaining of Left Shoulder and Left Elbow. Date of Injury/Onset: 6 weeks, no specific injury recalled. Lifts light weights.  Feels weakness left arm. Pain:    At Rest: 3/10 With Activity:  5/10 Mechanism of injury: Insidious This is [not] a Work Related Injury being treated under Worker's Compensation. This is [not] an athletic injury occurring associated with an interscholastic or organized sports team. Quality of symptoms: Weakness, Dull and achy Improves with: Aleve helps some Worse with: Lifting any heavy objects Prior treatment: None Prior imaging: None Previous injury: [None] Out of work/sport: [Yes], since [date of injury] School/Sport/Position/Occupation: Retired Additional Information: Patient reports that he will exercise with weights, notes that his left side is weaker. Patient reports that he experiences the most pain and weakness at night.  **Patient being monitored for Monoclonal Gammopathy

## 2024-09-11 NOTE — HISTORY OF PRESENT ILLNESS
[de-identified] : The patient is a 63 year old M, LEFThand dominant who presents today complaining of Left Shoulder and Left Elbow. Date of Injury/Onset: 6 weeks, no specific injury recalled. Lifts light weights.  Feels weakness left arm. Pain:    At Rest: 3/10 With Activity:  5/10 Mechanism of injury: Insidious This is [not] a Work Related Injury being treated under Worker's Compensation. This is [not] an athletic injury occurring associated with an interscholastic or organized sports team. Quality of symptoms: Weakness, Dull and achy Improves with: Aleve helps some Worse with: Lifting any heavy objects Prior treatment: None Prior imaging: None Previous injury: [None] Out of work/sport: [Yes], since [date of injury] School/Sport/Position/Occupation: Retired Additional Information: Patient reports that he will exercise with weights, notes that his left side is weaker. Patient reports that he experiences the most pain and weakness at night.  **Patient being monitored for Monoclonal Gammopathy

## 2024-09-11 NOTE — PHYSICAL EXAM
[Sitting] : sitting [Standing] : standing [Mild] : mild [4 ___] : forward flexion 4[unfilled]/5 [4___] : abduction 4[unfilled]/5 [NL (150)] : flexion 150 degrees [Pain with Flexion] : pain with flexion [NL (0)] : extension 0 degrees [NL (90)] : supination 90 degrees [5___] : extension 5[unfilled]/5 [Type 2 acromion] : Type 2 acromion [Degenerative change] : Degenerative change [FreeTextEntry3] : mild swelling lateral distal biceps [FreeTextEntry9] :  ER 40 [] : no tenderness over triceps [Left] : left elbow [There are no fractures, subluxations or dislocations. No significant abnormalities are seen] : There are no fractures, subluxations or dislocations. No significant abnormalities are seen [de-identified] : antecubital and proximal forearm [FreeTextEntry1] : olecranon spur

## 2024-09-11 NOTE — PHYSICAL EXAM
[Sitting] : sitting [Standing] : standing [Mild] : mild [4 ___] : forward flexion 4[unfilled]/5 [4___] : abduction 4[unfilled]/5 [NL (150)] : flexion 150 degrees [Pain with Flexion] : pain with flexion [NL (0)] : extension 0 degrees [NL (90)] : supination 90 degrees [5___] : extension 5[unfilled]/5 [Type 2 acromion] : Type 2 acromion [Degenerative change] : Degenerative change [FreeTextEntry3] : mild swelling lateral distal biceps [FreeTextEntry9] :  ER 40 [] : no tenderness over triceps [Left] : left elbow [There are no fractures, subluxations or dislocations. No significant abnormalities are seen] : There are no fractures, subluxations or dislocations. No significant abnormalities are seen [de-identified] : antecubital and proximal forearm [FreeTextEntry1] : olecranon spur

## 2024-09-11 NOTE — DISCUSSION/SUMMARY
[de-identified] :  Assessment:   The patient is a 63 year old male with physical exam findings consistent with LEFT  SHOULDER IMPINGEMENT AND LEFT ELBOW LE AND BT     - We discussed their diagnosis and treatment options at length including the risks and benefits of both surgical and non-surgical options. - We will continue conservative treatment with a course of PT, icing, and anti-inflammatory medication. - The patient was provided with a prescription to work on scapular strengthening and rotator cuff strengthening. - The patient was advised to let pain guide the gradual advancement of activities.   Follow up as needed in 6 weeks to re-evaluate progress with therapy

## 2024-10-23 ENCOUNTER — APPOINTMENT (OUTPATIENT)
Dept: HEMATOLOGY ONCOLOGY | Facility: CLINIC | Age: 64
End: 2024-10-23
Payer: COMMERCIAL

## 2024-10-23 VITALS
OXYGEN SATURATION: 98 % | HEART RATE: 79 BPM | BODY MASS INDEX: 27.09 KG/M2 | TEMPERATURE: 96.9 F | HEIGHT: 72 IN | WEIGHT: 200 LBS | SYSTOLIC BLOOD PRESSURE: 140 MMHG | DIASTOLIC BLOOD PRESSURE: 88 MMHG

## 2024-10-23 DIAGNOSIS — Z23 ENCOUNTER FOR IMMUNIZATION: ICD-10-CM

## 2024-10-23 DIAGNOSIS — Z86.2 PERSONAL HISTORY OF DISEASES OF THE BLOOD AND BLOOD-FORMING ORGANS AND CERTAIN DISORDERS INVOLVING THE IMMUNE MECHANISM: ICD-10-CM

## 2024-10-23 DIAGNOSIS — D47.2 MONOCLONAL GAMMOPATHY: ICD-10-CM

## 2024-10-23 LAB
ERYTHROCYTE [SEDIMENTATION RATE] IN BLOOD BY WESTERGREN METHOD: 2 MM/HR
RBC # BLD: 4.9 M/UL
RETICS # AUTO: 1.2 %
RETICS AGGREG/RBC NFR: 59.8 K/UL

## 2024-10-23 PROCEDURE — 90656 IIV3 VACC NO PRSV 0.5 ML IM: CPT

## 2024-10-23 PROCEDURE — 36415 COLL VENOUS BLD VENIPUNCTURE: CPT

## 2024-10-23 PROCEDURE — 90471 IMMUNIZATION ADMIN: CPT

## 2024-10-23 PROCEDURE — 99214 OFFICE O/P EST MOD 30 MIN: CPT | Mod: 25

## 2024-10-24 LAB
ALBUMIN SERPL ELPH-MCNC: 4.8 G/DL
ALP BLD-CCNC: 54 U/L
ALT SERPL-CCNC: 16 U/L
ANION GAP SERPL CALC-SCNC: 14 MMOL/L
AST SERPL-CCNC: 17 U/L
B2 MICROGLOB SERPL-MCNC: 1.6 MG/L
BASOPHILS # BLD AUTO: 0 K/UL
BASOPHILS # BLD AUTO: 0 K/UL
BASOPHILS NFR BLD AUTO: 0 %
BASOPHILS NFR BLD AUTO: 0 %
BILIRUB SERPL-MCNC: 0.4 MG/DL
BUN SERPL-MCNC: 13 MG/DL
CALCIUM SERPL-MCNC: 10 MG/DL
CHLORIDE SERPL-SCNC: 100 MMOL/L
CO2 SERPL-SCNC: 23 MMOL/L
CREAT SERPL-MCNC: 0.86 MG/DL
CRP SERPL-MCNC: <3 MG/L
EGFR: 97 ML/MIN/1.73M2
EOSINOPHIL # BLD AUTO: 0.1 K/UL
EOSINOPHIL # BLD AUTO: 0.1 K/UL
EOSINOPHIL NFR BLD AUTO: 1.7 %
EOSINOPHIL NFR BLD AUTO: 1.7 %
FERRITIN SERPL-MCNC: 76 NG/ML
FOLATE SERPL-MCNC: >20 NG/ML
GLUCOSE SERPL-MCNC: 89 MG/DL
HCT VFR BLD CALC: 45.3 %
HGB BLD-MCNC: 15.4 G/DL
IRON SATN MFR SERPL: 32 %
IRON SERPL-MCNC: 115 UG/DL
LDH SERPL-CCNC: 154 U/L
LYMPHOCYTES # BLD AUTO: 1.37 K/UL
LYMPHOCYTES # BLD AUTO: 1.37 K/UL
LYMPHOCYTES NFR BLD AUTO: 24.2 %
LYMPHOCYTES NFR BLD AUTO: 24.2 %
MAN DIFF?: NORMAL
MCHC RBC-ENTMCNC: 31.4 PG
MCHC RBC-ENTMCNC: 34 GM/DL
MCV RBC AUTO: 92.4 FL
MONOCYTES # BLD AUTO: 0.49 K/UL
MONOCYTES # BLD AUTO: 0.49 K/UL
MONOCYTES NFR BLD AUTO: 8.6 %
MONOCYTES NFR BLD AUTO: 8.6 %
MSMEAR: NORMAL
NEUTROPHILS # BLD AUTO: 3.72 K/UL
NEUTROPHILS # BLD AUTO: 3.72 K/UL
NEUTROPHILS NFR BLD AUTO: 65.5 %
NEUTROPHILS NFR BLD AUTO: 65.5 %
PLAT MORPH BLD: NORMAL
PLATELET # BLD AUTO: 285 K/UL
POTASSIUM SERPL-SCNC: 4.3 MMOL/L
PROT SERPL-MCNC: 7 G/DL
RBC # BLD: 4.9 M/UL
RBC # FLD: 12.5 %
RBC BLD AUTO: NORMAL
SODIUM SERPL-SCNC: 137 MMOL/L
TIBC SERPL-MCNC: 359 UG/DL
UIBC SERPL-MCNC: 245 UG/DL
URATE SERPL-MCNC: 5.6 MG/DL
VIT B12 SERPL-MCNC: 622 PG/ML
WBC # FLD AUTO: 5.68 K/UL

## 2024-10-27 LAB
ALBUMIN MFR SERPL ELPH: 66.9 %
ALBUMIN SERPL-MCNC: 4.7 G/DL
ALBUMIN/GLOB SERPL: 2 RATIO
ALPHA1 GLOB MFR SERPL ELPH: 3.4 %
ALPHA1 GLOB SERPL ELPH-MCNC: 0.2 G/DL
ALPHA2 GLOB MFR SERPL ELPH: 7.9 %
ALPHA2 GLOB SERPL ELPH-MCNC: 0.6 G/DL
B-GLOBULIN MFR SERPL ELPH: 9.5 %
B-GLOBULIN SERPL ELPH-MCNC: 0.7 G/DL
DEPRECATED KAPPA LC FREE/LAMBDA SER: 2.13 RATIO
GAMMA GLOB FLD ELPH-MCNC: 0.9 G/DL
GAMMA GLOB MFR SERPL ELPH: 12.3 %
IGA SER QL IEP: 101 MG/DL
IGG SER QL IEP: 823 MG/DL
IGM SER QL IEP: 77 MG/DL
INTERPRETATION SERPL IEP-IMP: NORMAL
KAPPA LC CSF-MCNC: 0.92 MG/DL
KAPPA LC SERPL-MCNC: 1.96 MG/DL
M PROTEIN MFR SERPL ELPH: NORMAL
M PROTEIN SPEC IFE-MCNC: NORMAL
MONOCLON BAND OBS SERPL: NORMAL
PROT SERPL-MCNC: 7 G/DL
PROT SERPL-MCNC: 7 G/DL

## 2024-10-29 LAB — VISCOSITY, SERUM: 1.6

## 2024-11-13 ENCOUNTER — APPOINTMENT (OUTPATIENT)
Dept: ORTHOPEDIC SURGERY | Facility: CLINIC | Age: 64
End: 2024-11-13

## 2024-11-13 VITALS — WEIGHT: 200 LBS | HEIGHT: 72 IN | BODY MASS INDEX: 27.09 KG/M2

## 2024-11-13 VITALS — BODY MASS INDEX: 27.09 KG/M2 | HEIGHT: 72 IN | WEIGHT: 200 LBS

## 2024-11-13 DIAGNOSIS — M77.02 MEDIAL EPICONDYLITIS, LEFT ELBOW: ICD-10-CM

## 2024-11-13 DIAGNOSIS — M75.42 IMPINGEMENT SYNDROME OF LEFT SHOULDER: ICD-10-CM

## 2024-11-13 PROCEDURE — 99214 OFFICE O/P EST MOD 30 MIN: CPT

## 2024-11-20 ENCOUNTER — APPOINTMENT (OUTPATIENT)
Dept: MRI IMAGING | Facility: CLINIC | Age: 64
End: 2024-11-20
Payer: COMMERCIAL

## 2024-11-20 PROCEDURE — 73221 MRI JOINT UPR EXTREM W/O DYE: CPT | Mod: NC

## 2024-11-21 ENCOUNTER — NON-APPOINTMENT (OUTPATIENT)
Age: 64
End: 2024-11-21

## 2024-12-11 ENCOUNTER — APPOINTMENT (OUTPATIENT)
Dept: ORTHOPEDIC SURGERY | Facility: CLINIC | Age: 64
End: 2024-12-11

## 2024-12-11 VITALS — WEIGHT: 200 LBS | BODY MASS INDEX: 27.09 KG/M2 | HEIGHT: 72 IN

## 2024-12-11 DIAGNOSIS — M75.22 BICIPITAL TENDINITIS, LEFT SHOULDER: ICD-10-CM

## 2024-12-11 DIAGNOSIS — M77.12 LATERAL EPICONDYLITIS, LEFT ELBOW: ICD-10-CM

## 2024-12-11 DIAGNOSIS — S46.219A STRAIN OF MUSCLE, FASCIA AND TENDON OF OTHER PARTS OF BICEPS, UNSPECIFIED ARM, INITIAL ENCOUNTER: ICD-10-CM

## 2024-12-11 DIAGNOSIS — S46.212D STRAIN OF MUSCLE, FASCIA AND TENDON OF OTHER PARTS OF BICEPS, LEFT ARM, SUBSEQUENT ENCOUNTER: ICD-10-CM

## 2024-12-11 DIAGNOSIS — M75.112 INCOMPLETE ROTATOR CUFF TEAR OR RUPTURE OF LEFT SHOULDER, NOT SPECIFIED AS TRAUMATIC: ICD-10-CM

## 2024-12-11 PROCEDURE — 99214 OFFICE O/P EST MOD 30 MIN: CPT

## 2025-01-22 ENCOUNTER — APPOINTMENT (OUTPATIENT)
Dept: ORTHOPEDIC SURGERY | Facility: CLINIC | Age: 65
End: 2025-01-22

## 2025-02-03 ENCOUNTER — APPOINTMENT (OUTPATIENT)
Dept: HEMATOLOGY ONCOLOGY | Facility: CLINIC | Age: 65
End: 2025-02-03
Payer: COMMERCIAL

## 2025-02-03 VITALS
WEIGHT: 198 LBS | HEIGHT: 72 IN | SYSTOLIC BLOOD PRESSURE: 130 MMHG | OXYGEN SATURATION: 99 % | DIASTOLIC BLOOD PRESSURE: 88 MMHG | TEMPERATURE: 97.9 F | BODY MASS INDEX: 26.82 KG/M2 | HEART RATE: 71 BPM

## 2025-02-03 DIAGNOSIS — R35.1 NOCTURIA: ICD-10-CM

## 2025-02-03 DIAGNOSIS — D47.2 MONOCLONAL GAMMOPATHY: ICD-10-CM

## 2025-02-03 DIAGNOSIS — R53.1 WEAKNESS: ICD-10-CM

## 2025-02-03 DIAGNOSIS — R53.83 OTHER FATIGUE: ICD-10-CM

## 2025-02-03 DIAGNOSIS — Z13.1 ENCOUNTER FOR SCREENING FOR DIABETES MELLITUS: ICD-10-CM

## 2025-02-03 DIAGNOSIS — R07.81 PLEURODYNIA: ICD-10-CM

## 2025-02-03 DIAGNOSIS — E78.5 HYPERLIPIDEMIA, UNSPECIFIED: ICD-10-CM

## 2025-02-03 DIAGNOSIS — Z86.2 PERSONAL HISTORY OF DISEASES OF THE BLOOD AND BLOOD-FORMING ORGANS AND CERTAIN DISORDERS INVOLVING THE IMMUNE MECHANISM: ICD-10-CM

## 2025-02-03 LAB
RBC # BLD: 5.19 M/UL
RETICS # AUTO: 1.3 %
RETICS AGGREG/RBC NFR: 65.9 K/UL

## 2025-02-03 PROCEDURE — 36415 COLL VENOUS BLD VENIPUNCTURE: CPT

## 2025-02-03 PROCEDURE — 99214 OFFICE O/P EST MOD 30 MIN: CPT | Mod: 25

## 2025-02-04 LAB
ALBUMIN SERPL ELPH-MCNC: 4.7 G/DL
ALP BLD-CCNC: 56 U/L
ALT SERPL-CCNC: 15 U/L
ANION GAP SERPL CALC-SCNC: 15 MMOL/L
AST SERPL-CCNC: 18 U/L
B2 MICROGLOB SERPL-MCNC: 1.6 MG/L
BASOPHILS # BLD AUTO: 0.05 K/UL
BASOPHILS # BLD AUTO: 0.05 K/UL
BASOPHILS NFR BLD AUTO: 0.9 %
BASOPHILS NFR BLD AUTO: 0.9 %
BILIRUB SERPL-MCNC: 0.4 MG/DL
BUN SERPL-MCNC: 11 MG/DL
CALCIUM SERPL-MCNC: 9.6 MG/DL
CHLORIDE SERPL-SCNC: 99 MMOL/L
CHOLEST SERPL-MCNC: 312 MG/DL
CO2 SERPL-SCNC: 24 MMOL/L
CREAT SERPL-MCNC: 0.96 MG/DL
CRP SERPL-MCNC: <3 MG/L
EGFR: 88 ML/MIN/1.73M2
EOSINOPHIL # BLD AUTO: 0.18 K/UL
EOSINOPHIL # BLD AUTO: 0.18 K/UL
EOSINOPHIL NFR BLD AUTO: 3.4 %
EOSINOPHIL NFR BLD AUTO: 3.4 %
ERYTHROCYTE [SEDIMENTATION RATE] IN BLOOD BY WESTERGREN METHOD: 2 MM/HR
FERRITIN SERPL-MCNC: 90 NG/ML
FOLATE SERPL-MCNC: 19.2 NG/ML
GIANT PLATELETS BLD QL SMEAR: PRESENT
GLUCOSE SERPL-MCNC: 73 MG/DL
HCT VFR BLD CALC: 47.9 %
HDLC SERPL-MCNC: 50 MG/DL
HGB BLD-MCNC: 15.5 G/DL
IRON SATN MFR SERPL: 32 %
IRON SERPL-MCNC: 119 UG/DL
LDH SERPL-CCNC: 178 U/L
LDLC SERPL CALC-MCNC: 225 MG/DL
LYMPHOCYTES # BLD AUTO: 1.09 K/UL
LYMPHOCYTES # BLD AUTO: 1.09 K/UL
LYMPHOCYTES NFR BLD AUTO: 20.5 %
LYMPHOCYTES NFR BLD AUTO: 20.5 %
MAN DIFF?: NORMAL
MANUAL SMEAR: NORMAL
MCHC RBC-ENTMCNC: 29.9 PG
MCHC RBC-ENTMCNC: 32.4 G/DL
MCV RBC AUTO: 92.3 FL
MONOCYTES # BLD AUTO: 0.5 K/UL
MONOCYTES # BLD AUTO: 0.5 K/UL
MONOCYTES NFR BLD AUTO: 9.4 %
MONOCYTES NFR BLD AUTO: 9.4 %
NEUTROPHILS # BLD AUTO: 3.49 K/UL
NEUTROPHILS # BLD AUTO: 3.49 K/UL
NEUTROPHILS NFR BLD AUTO: 65.8 %
NEUTROPHILS NFR BLD AUTO: 65.8 %
NONHDLC SERPL-MCNC: 263 MG/DL
PLAT MORPH BLD: ABNORMAL
PLATELET # BLD AUTO: 304 K/UL
POTASSIUM SERPL-SCNC: 3.9 MMOL/L
PROT SERPL-MCNC: 7.2 G/DL
RBC # BLD: 5.19 M/UL
RBC # FLD: 12.6 %
RBC BLD AUTO: NORMAL
SODIUM SERPL-SCNC: 138 MMOL/L
TESTOST SERPL-MCNC: 663 NG/DL
TIBC SERPL-MCNC: 368 UG/DL
TRIGL SERPL-MCNC: 191 MG/DL
TSH SERPL-ACNC: 2.76 UIU/ML
UIBC SERPL-MCNC: 249 UG/DL
URATE SERPL-MCNC: 5.7 MG/DL
VIT B12 SERPL-MCNC: 574 PG/ML
WBC # FLD AUTO: 5.3 K/UL

## 2025-02-05 LAB
PSA FREE FLD-MCNC: 27 %
PSA FREE SERPL-MCNC: 0.4 NG/ML
PSA SERPL-MCNC: 1.46 NG/ML

## 2025-02-07 ENCOUNTER — APPOINTMENT (OUTPATIENT)
Dept: RADIOLOGY | Facility: CLINIC | Age: 65
End: 2025-02-07
Payer: COMMERCIAL

## 2025-02-07 ENCOUNTER — OUTPATIENT (OUTPATIENT)
Dept: OUTPATIENT SERVICES | Facility: HOSPITAL | Age: 65
LOS: 1 days | End: 2025-02-07
Payer: COMMERCIAL

## 2025-02-07 DIAGNOSIS — Z00.8 ENCOUNTER FOR OTHER GENERAL EXAMINATION: ICD-10-CM

## 2025-02-07 PROCEDURE — 71046 X-RAY EXAM CHEST 2 VIEWS: CPT | Mod: 26

## 2025-02-07 PROCEDURE — 71046 X-RAY EXAM CHEST 2 VIEWS: CPT

## 2025-02-08 LAB
ALBUMIN MFR SERPL ELPH: 68.1 %
ALBUMIN SERPL-MCNC: 4.9 G/DL
ALBUMIN/GLOB SERPL: 2.1 RATIO
ALPHA1 GLOB MFR SERPL ELPH: 3.2 %
ALPHA1 GLOB SERPL ELPH-MCNC: 0.2 G/DL
ALPHA2 GLOB MFR SERPL ELPH: 7.4 %
ALPHA2 GLOB SERPL ELPH-MCNC: 0.5 G/DL
B-GLOBULIN MFR SERPL ELPH: 9.8 %
B-GLOBULIN SERPL ELPH-MCNC: 0.7 G/DL
DEPRECATED KAPPA LC FREE/LAMBDA SER: 1.82 RATIO
GAMMA GLOB FLD ELPH-MCNC: 0.8 G/DL
GAMMA GLOB MFR SERPL ELPH: 11.5 %
IGA SER QL IEP: 101 MG/DL
IGG SER QL IEP: 872 MG/DL
IGM SER QL IEP: 86 MG/DL
INTERPRETATION SERPL IEP-IMP: NORMAL
KAPPA LC CSF-MCNC: 0.89 MG/DL
KAPPA LC SERPL-MCNC: 1.62 MG/DL
M PROTEIN MFR SERPL ELPH: NORMAL
M PROTEIN SPEC IFE-MCNC: NORMAL
MONOCLON BAND OBS SERPL: NORMAL
PROT SERPL-MCNC: 7.2 G/DL
PROT SERPL-MCNC: 7.2 G/DL

## 2025-02-13 LAB — VISCOSITY, SERUM: 1.9

## 2025-02-24 ENCOUNTER — NON-APPOINTMENT (OUTPATIENT)
Age: 65
End: 2025-02-24

## 2025-02-25 ENCOUNTER — OUTPATIENT (OUTPATIENT)
Dept: OUTPATIENT SERVICES | Facility: HOSPITAL | Age: 65
LOS: 1 days | End: 2025-02-25

## 2025-02-25 ENCOUNTER — APPOINTMENT (OUTPATIENT)
Dept: NUCLEAR MEDICINE | Facility: CLINIC | Age: 65
End: 2025-02-25

## 2025-02-25 DIAGNOSIS — R07.81 PLEURODYNIA: ICD-10-CM

## 2025-02-25 PROCEDURE — 78816 PET IMAGE W/CT FULL BODY: CPT | Mod: 26,PI

## 2025-03-12 ENCOUNTER — TRANSCRIPTION ENCOUNTER (OUTPATIENT)
Age: 65
End: 2025-03-12

## 2025-03-24 ENCOUNTER — NON-APPOINTMENT (OUTPATIENT)
Age: 65
End: 2025-03-24

## 2025-05-22 ENCOUNTER — APPOINTMENT (OUTPATIENT)
Dept: HEMATOLOGY ONCOLOGY | Facility: CLINIC | Age: 65
End: 2025-05-22
Payer: COMMERCIAL

## 2025-05-22 VITALS
DIASTOLIC BLOOD PRESSURE: 78 MMHG | SYSTOLIC BLOOD PRESSURE: 124 MMHG | OXYGEN SATURATION: 97 % | HEIGHT: 72 IN | BODY MASS INDEX: 27.09 KG/M2 | TEMPERATURE: 97.6 F | HEART RATE: 74 BPM | WEIGHT: 200 LBS

## 2025-05-22 DIAGNOSIS — E78.00 PURE HYPERCHOLESTEROLEMIA, UNSPECIFIED: ICD-10-CM

## 2025-05-22 DIAGNOSIS — D47.2 MONOCLONAL GAMMOPATHY: ICD-10-CM

## 2025-05-22 LAB
RBC # BLD: 4.84 M/UL
RETICS # AUTO: 1.3 %
RETICS AGGREG/RBC NFR: 64.9 K/UL

## 2025-05-22 PROCEDURE — 36415 COLL VENOUS BLD VENIPUNCTURE: CPT

## 2025-05-22 PROCEDURE — 99214 OFFICE O/P EST MOD 30 MIN: CPT | Mod: 25

## 2025-05-23 LAB
ALBUMIN SERPL ELPH-MCNC: 4.8 G/DL
ALP BLD-CCNC: 54 U/L
ALT SERPL-CCNC: 56 U/L
ANION GAP SERPL CALC-SCNC: 15 MMOL/L
AST SERPL-CCNC: 36 U/L
B2 MICROGLOB SERPL-MCNC: 1.6 MG/L
BILIRUB SERPL-MCNC: 0.5 MG/DL
BUN SERPL-MCNC: 13 MG/DL
CALCIUM SERPL-MCNC: 10 MG/DL
CHLORIDE SERPL-SCNC: 100 MMOL/L
CHOLEST SERPL-MCNC: 141 MG/DL
CO2 SERPL-SCNC: 24 MMOL/L
CREAT SERPL-MCNC: 0.87 MG/DL
CRP SERPL-MCNC: <3 MG/L
EGFRCR SERPLBLD CKD-EPI 2021: 96 ML/MIN/1.73M2
ERYTHROCYTE [SEDIMENTATION RATE] IN BLOOD BY WESTERGREN METHOD: 2 MM/HR
FERRITIN SERPL-MCNC: 102 NG/ML
FOLATE SERPL-MCNC: >20 NG/ML
FREE KAPPA URINE: 6.46 MG/L
FREE KAPPA/LAMDA RATIO: 6.27 RATIO
FREE LAMDA URINE: 1.03 MG/L
GIANT PLATELETS BLD QL SMEAR: PRESENT
GLUCOSE SERPL-MCNC: 78 MG/DL
HCT VFR BLD CALC: 44.8 %
HDLC SERPL-MCNC: 52 MG/DL
HGB BLD-MCNC: 15.3 G/DL
IRON SATN MFR SERPL: 32 %
IRON SERPL-MCNC: 109 UG/DL
LDH SERPL-CCNC: 164 U/L
LDLC SERPL-MCNC: 68 MG/DL
LYMPHOCYTES # BLD AUTO: 1.29 K/UL
LYMPHOCYTES # BLD AUTO: 1.29 K/UL
MAN DIFF?: NORMAL
MANUAL SMEAR: NORMAL
MCHC RBC-ENTMCNC: 31.6 PG
MCHC RBC-ENTMCNC: 34.2 G/DL
MCV RBC AUTO: 92.6 FL
NEUTROPHILS # BLD AUTO: 4.31 K/UL
NEUTROPHILS # BLD AUTO: 4.31 K/UL
NONHDLC SERPL-MCNC: 89 MG/DL
PLAT MORPH BLD: NORMAL
PLATELET # BLD AUTO: 310 K/UL
POTASSIUM SERPL-SCNC: 4.3 MMOL/L
PROT SERPL-MCNC: 7.2 G/DL
RBC # BLD: 4.84 M/UL
RBC # FLD: 12.3 %
RBC BLD AUTO: NORMAL
SODIUM SERPL-SCNC: 139 MMOL/L
TIBC SERPL-MCNC: 347 UG/DL
TRIGL SERPL-MCNC: 118 MG/DL
UIBC SERPL-MCNC: 237 UG/DL
URATE SERPL-MCNC: 5.1 MG/DL
VIT B12 SERPL-MCNC: 661 PG/ML
WBC # FLD AUTO: 6.14 K/UL

## 2025-05-27 LAB
ALBUMIN MFR SERPL ELPH: 66.1 %
ALBUMIN SERPL-MCNC: 4.8 G/DL
ALBUMIN/GLOB SERPL: 2 RATIO
ALPHA1 GLOB MFR SERPL ELPH: 3.9 %
ALPHA1 GLOB SERPL ELPH-MCNC: 0.3 G/DL
ALPHA2 GLOB MFR SERPL ELPH: 8.1 %
ALPHA2 GLOB SERPL ELPH-MCNC: 0.6 G/DL
B-GLOBULIN MFR SERPL ELPH: 9.8 %
B-GLOBULIN SERPL ELPH-MCNC: 0.7 G/DL
DEPRECATED KAPPA LC FREE/LAMBDA SER: 1.75 RATIO
GAMMA GLOB FLD ELPH-MCNC: 0.9 G/DL
GAMMA GLOB MFR SERPL ELPH: 12.1 %
IGA SERPL-MCNC: 88 MG/DL
IGG SERPL-MCNC: 814 MG/DL
IGM SERPL-MCNC: 75 MG/DL
INTERPRETATION SERPL IEP-IMP: NORMAL
KAPPA LC CSF-MCNC: 0.93 MG/DL
KAPPA LC SERPL-MCNC: 1.63 MG/DL
M PROTEIN MFR SERPL ELPH: NORMAL
M PROTEIN SPEC IFE-MCNC: NORMAL
MONOCLON BAND OBS SERPL: NORMAL
PROT SERPL-MCNC: 7.2 G/DL
PROT SERPL-MCNC: 7.2 G/DL

## 2025-07-14 ENCOUNTER — NON-APPOINTMENT (OUTPATIENT)
Age: 65
End: 2025-07-14